# Patient Record
Sex: FEMALE | Race: BLACK OR AFRICAN AMERICAN | NOT HISPANIC OR LATINO | URBAN - METROPOLITAN AREA
[De-identification: names, ages, dates, MRNs, and addresses within clinical notes are randomized per-mention and may not be internally consistent; named-entity substitution may affect disease eponyms.]

---

## 2017-03-01 ENCOUNTER — MYC MEDICAL ADVICE (OUTPATIENT)
Dept: FAMILY MEDICINE | Facility: CLINIC | Age: 42
End: 2017-03-01

## 2017-03-02 ENCOUNTER — OFFICE VISIT (OUTPATIENT)
Dept: FAMILY MEDICINE | Facility: CLINIC | Age: 42
End: 2017-03-02
Payer: COMMERCIAL

## 2017-03-02 VITALS
WEIGHT: 237 LBS | BODY MASS INDEX: 33.93 KG/M2 | SYSTOLIC BLOOD PRESSURE: 134 MMHG | HEIGHT: 70 IN | OXYGEN SATURATION: 98 % | TEMPERATURE: 97.9 F | HEART RATE: 83 BPM | DIASTOLIC BLOOD PRESSURE: 83 MMHG

## 2017-03-02 DIAGNOSIS — J20.8 VIRAL BRONCHITIS: Primary | ICD-10-CM

## 2017-03-02 PROCEDURE — 99213 OFFICE O/P EST LOW 20 MIN: CPT | Performed by: FAMILY MEDICINE

## 2017-03-02 RX ORDER — FLUTICASONE PROPIONATE 50 MCG
1-2 SPRAY, SUSPENSION (ML) NASAL DAILY
Qty: 1 BOTTLE | Refills: 11 | Status: SHIPPED | OUTPATIENT
Start: 2017-03-02 | End: 2018-04-15

## 2017-03-02 RX ORDER — PREDNISONE 20 MG/1
40 TABLET ORAL DAILY
Qty: 10 TABLET | Refills: 0 | Status: SHIPPED | OUTPATIENT
Start: 2017-03-02 | End: 2017-03-07

## 2017-03-02 NOTE — NURSING NOTE
"Chief Complaint   Patient presents with     URI       Initial /83 (BP Location: Right arm, Cuff Size: Adult Large)  Pulse 83  Temp 97.9  F (36.6  C) (Tympanic)  Ht 5' 10\" (1.778 m)  Wt 237 lb (107.5 kg)  SpO2 98%  BMI 34.01 kg/m2 Estimated body mass index is 34.01 kg/(m^2) as calculated from the following:    Height as of this encounter: 5' 10\" (1.778 m).    Weight as of this encounter: 237 lb (107.5 kg).  Medication Reconciliation: complete  "

## 2017-03-02 NOTE — PROGRESS NOTES
SUBJECTIVE:                                                    Cristo Connolly is a 42 year old female who presents to clinic today for the following health issues:      Acute Illness   Acute illness concerns: cough   Onset: 1 week     Fever: no     Chills/Sweats: no     Headache (location?): no     Sinus Pressure:YES    Conjunctivitis:  no    Ear Pain: no    Rhinorrhea: YES    Congestion: YES    Sore Throat: no      Cough: YES-non-productive, productive of yellow sputum, productive of green sputum, with shortness of breath    Wheeze: no     Decreased Appetite: no     Nausea: no     Vomiting: no     Diarrhea:  no     Dysuria/Freq.: no     Fatigue/Achiness: YES    Sick/Strep Exposure: YES- work     Therapies Tried and outcome: nyquil rick lemon and honey       Patient is a 42 yr old female here with cough of 5 days duration. Cough is mostly dry though atimes it is productive. Patient reports that she has had no shortness of breath, a little bit of burning in the chest , no fevers or chills. She has been taking some over the counter medication without much relief. She is also mildly congested.     Problem list and histories reviewed & adjusted, as indicated.  Additional history: as documented    Patient Active Problem List   Diagnosis     Internal hemorrhoids with complication     Past Surgical History   Procedure Laterality Date     Hemorrhoidectomy internal  5/16/2014     Procedure: HEMORRHOIDECTOMY INTERNAL;  Surgeon: Myriam Joshua MD;  Location: [facility] OR     Exam under anesthesia rectum N/A 4/5/2016     Procedure: EXAM UNDER ANESTHESIA RECTUM;  Surgeon: Aric Eason MD;  Location: [facility] OR       Social History   Substance Use Topics     Smoking status: Never Smoker     Smokeless tobacco: Not on file     Alcohol use No     No family history on file.      Current Outpatient Prescriptions   Medication Sig Dispense Refill     predniSONE (DELTASONE) 20 MG tablet Take 2 tablets (40 mg) by mouth daily for 5  "days 10 tablet 0     fluticasone (FLONASE) 50 MCG/ACT spray Spray 1-2 sprays into both nostrils daily 1 Bottle 11     NIFEdipine POWD Trying to order nifedipine gel 0.2 % every eight hours (Patient not taking: Reported on 3/2/2017) 1 Bottle 1     HYDROcodone-acetaminophen (NORCO) 5-325 MG per tablet Take 1-2 tablets by mouth every 6 hours as needed (Patient not taking: Reported on 3/2/2017) 45 tablet 0     nitroglycerin 0.2% oint, FV COMPOUNDED, 0.2% OINT Apply 30 g topically 4 times daily as needed (Patient not taking: Reported on 3/2/2017) 30 g 0     Acetaminophen (TYLENOL PO) Reported on 3/2/2017       docusate sodium 100 MG tablet Take 100 mg by mouth two times daily (Patient not taking: Reported on 3/2/2017) 60 tablet 1     Naproxen Sodium (ALEVE PO) Take 220 mg by mouth 2 times daily (with meals) Reported on 3/2/2017       Allergies   Allergen Reactions     Quinine      BP Readings from Last 3 Encounters:   03/02/17 134/83   04/25/16 112/67   04/05/16 130/70    Wt Readings from Last 3 Encounters:   03/02/17 237 lb (107.5 kg)   04/25/16 280 lb (127 kg)   04/05/16 280 lb (127 kg)                  Labs reviewed in EPIC    Reviewed and updated as needed this visit by clinical staff  Allergies       Reviewed and updated as needed this visit by Provider         ROS:  Constitutional, HEENT, cardiovascular, pulmonary, gi and gu systems are negative, except as otherwise noted.    OBJECTIVE:                                                    /83 (BP Location: Right arm, Cuff Size: Adult Large)  Pulse 83  Temp 97.9  F (36.6  C) (Tympanic)  Ht 5' 10\" (1.778 m)  Wt 237 lb (107.5 kg)  SpO2 98%  BMI 34.01 kg/m2  Body mass index is 34.01 kg/(m^2).  GENERAL: healthy, alert and no distress  NECK: no adenopathy, no asymmetry, masses, or scars and thyroid normal to palpation  RESP: lungs clear to auscultation - no rales, rhonchi or wheezes  CV: regular rate and rhythm, normal S1 S2, no S3 or S4, no murmur, click or rub, no " peripheral edema and peripheral pulses strong  MS: no gross musculoskeletal defects noted, no edema    Diagnostic Test Results:  none      ASSESSMENT/PLAN:                                                          ICD-10-CM    1. Viral bronchitis J20.8 predniSONE (DELTASONE) 20 MG tablet     fluticasone (FLONASE) 50 MCG/ACT spray       FUTURE APPOINTMENTS:       - Follow-up visit as needed.    Antoni Palmer MD  [facility]

## 2017-03-02 NOTE — MR AVS SNAPSHOT
After Visit Summary   3/2/2017    Cristo Connolly    MRN: [id number]           Patient Information     Date Of Birth          1975        Visit Information        Provider Department      3/2/2017 7:40 AM Antoni Palmer MD [facility]        Today's Diagnoses     Viral bronchitis    -  1      Care Instructions          Thank you for choosing [facility].  You may be receiving a survey in the mail from [facility] regarding your visit today.  Please take a few minutes to complete and return the survey to let us know how we are doing.      If you have questions or concerns, please contact us via [employer] or you can contact your care team at [phone].    Our Clinic hours are:  Monday 6:40 am  to 7:00 pm  Tuesday -Friday 6:40 am to 5:00 pm    The [place] outpatient lab hours are:  Monday - Friday 6:10 am to 4:45 pm  Saturdays 7:00 am to 11:00 am  Appointments are required, call [phone]    If you have clinical questions after hours or would like to schedule an appointment,  call the clinic at [phone].  Bronchitis, Viral (Adult)    You have a viral bronchitis. Bronchitis is inflammation and swelling of the lining of the lungs. This is often caused by an infection. Symptoms include a dry, hacking cough that is worse at night. The cough may bring up yellow-green mucus. You may also feel short of breath or wheeze. Other symptoms may include tiredness, chest discomfort, and chills.  Bronchitis that is caused by a virus is not treated with antibiotics. Instead, medicines may be given to help relieve symptoms. Symptoms can last up to 2 weeks, although the cough may last much longer.  This illness is contagious during the first few days and is spread through the air by coughing and sneezing, or by direct contact (touching the sick person and then touching your own eyes, nose, or mouth).  Most viral illnesses resolve within 10 to 14 days with rest and simple home  remedies, although they may sometimes last for several weeks.  Home care    If symptoms are severe, rest at home for the first 2 to 3 days. When you go back to your usual activities, don't let yourself get too tired.    Do not smoke. Also avoid being exposed to secondhand smoke.    You may use over-the-counter medicine to control fever or pain, unless another pain medicine was prescribed. (Note: If you have chronic liver or kidney disease or have ever had a stomach ulcer or gastrointestinal bleeding, talk with your healthcare provider before using these medicines. Also talk to your provider if you are taking medicine to prevent blood clots.) Aspirin should never be given to anyone younger than 18 years of age who is ill with a viral infection or fever. It may cause severe liver or brain damage.    Your appetite may be poor, so a light diet is fine. Avoid dehydration by drinking 6 to 8 glasses of fluids per day (such as water, soft drinks, sports drinks, juices, tea, or soup). Extra fluids will help loosen secretions in the nose and lungs.    Over-the-counter cough, cold, and sore-throat medicines will not shorten the length of the illness, but they may help to reduce symptoms. (Note: Do not use decongestants if you have high blood pressure.)  Follow-up care  Follow up with your healthcare provider, or as advised.  If you had an X-ray or ECG (electrocardiogram), a specialist will review it. You will be notified of any new findings that may affect your care.  Note: If you are age 65 or older, or if you have a chronic lung disease or condition that affects your immune system, or you smoke, talk to your healthcare provider about having pneumococcal vaccinations and a yearly influenza vaccination (flu shot).  When to seek medical advice   Call your healthcare provider right away if any of these occur:    Fever of 100.4 F (38 C) or higher    Coughing up increased amounts of colored sputum    Weakness, drowsiness,  headache, facial pain, ear pain, or a stiff neck  Call 911, or get immediate medical care  Contact emergency services right away if any of these occur:    Coughing up blood    Worsening weakness, drowsiness, headache, or stiff neck    Trouble breathing, wheezing, or pain with breathing    4267-9403 [employer]. [address] [place] [address]. All rights reserved. This information is not intended as a substitute for professional medical care. Always follow your healthcare professional's instructions.              Follow-ups after your visit        Who to contact     If you have questions or need follow up information about today's clinic visit or your schedule please contact [facility] directly at [phone].  Normal or non-critical lab and imaging results will be communicated to you by [employer], letter or phone within 4 business days after the clinic has received the results. If you do not hear from us within 7 days, please contact the clinic through [employer] or phone. If you have a critical or abnormal lab result, we will notify you by phone as soon as possible.  Submit refill requests through [email or web address] or call your pharmacy and they will forward the refill request to us. Please allow 3 business days for your refill to be completed.          Additional Information About Your Visit        [employer] Information     [email or web address] gives you secure access to your electronic health record. If you see a primary care provider, you can also send messages to your care team and make appointments. If you have questions, please call your primary care clinic.  If you do not have a primary care provider, please call [phone] and they will assist you.        Care EveryWhere ID     This is your Care EveryWhere ID. This could be used by other organizations to access your [place] medical records  [id number]        Your Vitals Were     Pulse Temperature Height Pulse Oximetry BMI (Body Mass Index)     "   83 97.9  F (36.6  C) (Tympanic) 5' 10\" (1.778 m) 98% 34.01 kg/m2        Blood Pressure from Last 3 Encounters:   03/02/17 134/83   04/25/16 112/67   04/05/16 130/70    Weight from Last 3 Encounters:   03/02/17 237 lb (107.5 kg)   04/25/16 280 lb (127 kg)   04/05/16 280 lb (127 kg)              Today, you had the following     No orders found for display         Today's Medication Changes          These changes are accurate as of: 3/2/17  8:02 AM.  If you have any questions, ask your nurse or doctor.               Start taking these medicines.        Dose/Directions    fluticasone 50 MCG/ACT spray   Commonly known as:  FLONASE   Used for:  Viral bronchitis   Started by:  Antoni Palmer MD        Dose:  1-2 spray   Spray 1-2 sprays into both nostrils daily   Quantity:  1 Bottle   Refills:  11       predniSONE 20 MG tablet   Commonly known as:  DELTASONE   Used for:  Viral bronchitis   Started by:  Antoni Palmer MD        Dose:  40 mg   Take 2 tablets (40 mg) by mouth daily for 5 days   Quantity:  10 tablet   Refills:  0            Where to get your medicines      These medications were sent to [facility] [address]  [address] [place] [address]     Phone:  [phone]     fluticasone 50 MCG/ACT spray    predniSONE 20 MG tablet                Primary Care Provider    None       No address on file        Thank you!     Thank you for choosing [facility]  for your care. Our goal is always to provide you with excellent care. Hearing back from our patients is one way we can continue to improve our services. Please take a few minutes to complete the written survey that you may receive in the mail after your visit with us. Thank you!             Your Updated Medication List - Protect others around you: Learn how to safely use, store and throw away your medicines at www.disposemymeds.org.          This list is accurate as of: 3/2/17  8:02 " AM.  Always use your most recent med list.                   Brand Name Dispense Instructions for use    ALEVE PO      Take 220 mg by mouth 2 times daily (with meals) Reported on 3/2/2017       docusate sodium 100 MG tablet    COLACE    60 tablet    Take 100 mg by mouth two times daily       fluticasone 50 MCG/ACT spray    FLONASE    1 Bottle    Spray 1-2 sprays into both nostrils daily       HYDROcodone-acetaminophen 5-325 MG per tablet    NORCO    45 tablet    Take 1-2 tablets by mouth every 6 hours as needed       NIFEdipine Powd     1 Bottle    Trying to order nifedipine gel 0.2 % every eight hours       nitroglycerin 0.2% oint (FV COMPOUNDED) 0.2% Oint     30 g    Apply 30 g topically 4 times daily as needed       predniSONE 20 MG tablet    DELTASONE    10 tablet    Take 2 tablets (40 mg) by mouth daily for 5 days       TYLENOL PO      Reported on 3/2/2017

## 2017-03-02 NOTE — PATIENT INSTRUCTIONS
Thank you for choosing [facility].  You may be receiving a survey in the mail from [employer] [facility] regarding your visit today.  Please take a few minutes to complete and return the survey to let us know how we are doing.      If you have questions or concerns, please contact us via "[employer] or you can contact your care team at [phone].    Our Clinic hours are:  Monday 6:40 am  to 7:00 pm  Tuesday -Friday 6:40 am to 5:00 pm    The [place] outpatient lab hours are:  Monday - Friday 6:10 am to 4:45 pm  Saturdays 7:00 am to 11:00 am  Appointments are required, call [phone]    If you have clinical questions after hours or would like to schedule an appointment,  call the clinic at [phone].  Bronchitis, Viral (Adult)    You have a viral bronchitis. Bronchitis is inflammation and swelling of the lining of the lungs. This is often caused by an infection. Symptoms include a dry, hacking cough that is worse at night. The cough may bring up yellow-green mucus. You may also feel short of breath or wheeze. Other symptoms may include tiredness, chest discomfort, and chills.  Bronchitis that is caused by a virus is not treated with antibiotics. Instead, medicines may be given to help relieve symptoms. Symptoms can last up to 2 weeks, although the cough may last much longer.  This illness is contagious during the first few days and is spread through the air by coughing and sneezing, or by direct contact (touching the sick person and then touching your own eyes, nose, or mouth).  Most viral illnesses resolve within 10 to 14 days with rest and simple home remedies, although they may sometimes last for several weeks.  Home care    If symptoms are severe, rest at home for the first 2 to 3 days. When you go back to your usual activities, don't let yourself get too tired.    Do not smoke. Also avoid being exposed to secondhand smoke.    You may use over-the-counter medicine to control fever or pain, unless another  pain medicine was prescribed. (Note: If you have chronic liver or kidney disease or have ever had a stomach ulcer or gastrointestinal bleeding, talk with your healthcare provider before using these medicines. Also talk to your provider if you are taking medicine to prevent blood clots.) Aspirin should never be given to anyone younger than 18 years of age who is ill with a viral infection or fever. It may cause severe liver or brain damage.    Your appetite may be poor, so a light diet is fine. Avoid dehydration by drinking 6 to 8 glasses of fluids per day (such as water, soft drinks, sports drinks, juices, tea, or soup). Extra fluids will help loosen secretions in the nose and lungs.    Over-the-counter cough, cold, and sore-throat medicines will not shorten the length of the illness, but they may help to reduce symptoms. (Note: Do not use decongestants if you have high blood pressure.)  Follow-up care  Follow up with your healthcare provider, or as advised.  If you had an X-ray or ECG (electrocardiogram), a specialist will review it. You will be notified of any new findings that may affect your care.  Note: If you are age 65 or older, or if you have a chronic lung disease or condition that affects your immune system, or you smoke, talk to your healthcare provider about having pneumococcal vaccinations and a yearly influenza vaccination (flu shot).  When to seek medical advice   Call your healthcare provider right away if any of these occur:    Fever of 100.4 F (38 C) or higher    Coughing up increased amounts of colored sputum    Weakness, drowsiness, headache, facial pain, ear pain, or a stiff neck  Call 911, or get immediate medical care  Contact emergency services right away if any of these occur:    Coughing up blood    Worsening weakness, drowsiness, headache, or stiff neck    Trouble breathing, wheezing, or pain with breathing    [phone] [employer]. [address] [place] [address], [place] [address].  All rights reserved. This information is not intended as a substitute for professional medical care. Always follow your healthcare professional's instructions.

## 2017-12-17 ENCOUNTER — HEALTH MAINTENANCE LETTER (OUTPATIENT)
Age: 42
End: 2017-12-17

## 2018-04-15 ENCOUNTER — HOSPITAL ENCOUNTER (EMERGENCY)
Facility: CLINIC | Age: 43
Discharge: HOME | End: 2018-04-15
Attending: FAMILY MEDICINE | Admitting: FAMILY MEDICINE

## 2018-04-15 ENCOUNTER — APPOINTMENT (OUTPATIENT)
Dept: GENERAL RADIOLOGY | Facility: CLINIC | Age: 43
End: 2018-04-15
Attending: FAMILY MEDICINE

## 2018-04-15 VITALS
OXYGEN SATURATION: 100 % | SYSTOLIC BLOOD PRESSURE: 158 MMHG | HEART RATE: 62 BPM | DIASTOLIC BLOOD PRESSURE: 81 MMHG | RESPIRATION RATE: 18 BRPM | TEMPERATURE: 98.4 F

## 2018-04-15 DIAGNOSIS — S89.91XA INJURY OF RIGHT KNEE, INITIAL ENCOUNTER: ICD-10-CM

## 2018-04-15 PROCEDURE — 99283 EMERGENCY DEPT VISIT LOW MDM: CPT | Performed by: FAMILY MEDICINE

## 2018-04-15 PROCEDURE — 99282 EMERGENCY DEPT VISIT SF MDM: CPT | Mod: Z6 | Performed by: FAMILY MEDICINE

## 2018-04-15 PROCEDURE — 73562 X-RAY EXAM OF KNEE 3: CPT | Mod: RT

## 2018-04-15 ASSESSMENT — ENCOUNTER SYMPTOMS
FEVER: 0
MYALGIAS: 1
WEAKNESS: 0

## 2018-04-15 NOTE — ED PROVIDER NOTES
History     Chief Complaint   Patient presents with     Knee Pain     HPI  Cristo Connolly is a 43 year old female with no history of medical problems presents to the ED with right knee pain. The patient reports yesterday she slipped and fell with a flexed right knee, striking the medial aspect of the right knee against the snow. The patient reports she was able to stand and walk after the fall. She reports the majority of the pain came on after she went to bed.   decreased range of motion overnight    Patient Active Problem List   Diagnosis     Internal hemorrhoids with complication     Current Outpatient Prescriptions   Medication Sig Dispense Refill     fluticasone (FLONASE) 50 MCG/ACT spray Spray 1-2 sprays into both nostrils daily 1 Bottle 11     NIFEdipine POWD Trying to order nifedipine gel 0.2 % every eight hours (Patient not taking: Reported on 3/2/2017) 1 Bottle 1     HYDROcodone-acetaminophen (NORCO) 5-325 MG per tablet Take 1-2 tablets by mouth every 6 hours as needed (Patient not taking: Reported on 3/2/2017) 45 tablet 0     nitroglycerin 0.2% oint, FV COMPOUNDED, 0.2% OINT Apply 30 g topically 4 times daily as needed (Patient not taking: Reported on 3/2/2017) 30 g 0     Acetaminophen (TYLENOL PO) Reported on 3/2/2017       docusate sodium 100 MG tablet Take 100 mg by mouth two times daily (Patient not taking: Reported on 3/2/2017) 60 tablet 1     Naproxen Sodium (ALEVE PO) Take 220 mg by mouth 2 times daily (with meals) Reported on 3/2/2017       Allergies   Allergen Reactions     Quinine      Problem List:    Patient Active Problem List    Diagnosis Date Noted     Internal hemorrhoids with complication 05/15/2014     Priority: Medium        Past Medical History:    No past medical history on file.    Past Surgical History:    Past Surgical History:   Procedure Laterality Date     EXAM UNDER ANESTHESIA RECTUM N/A 4/5/2016    Procedure: EXAM UNDER ANESTHESIA RECTUM;  Surgeon: Aric Eason MD;   Location: [facility] OR     HEMORRHOIDECTOMY INTERNAL  5/16/2014    Procedure: HEMORRHOIDECTOMY INTERNAL;  Surgeon: Myriam Joshua MD;  Location: [facility] OR       Family History:    No family history on file.    Social History:  Marital Status:  Single [1]  Social History   Substance Use Topics     Smoking status: Never Smoker     Smokeless tobacco: Not on file     Alcohol use No        Medications:      fluticasone (FLONASE) 50 MCG/ACT spray   NIFEdipine POWD   HYDROcodone-acetaminophen (NORCO) 5-325 MG per tablet   nitroglycerin 0.2% oint, FV COMPOUNDED, 0.2% OINT   Acetaminophen (TYLENOL PO)   docusate sodium 100 MG tablet   Naproxen Sodium (ALEVE PO)         Review of Systems   Constitutional: Negative for fever.   Musculoskeletal: Positive for myalgias.   Skin: Negative for pallor.   Neurological: Negative for weakness.   All other systems reviewed and are negative.      Physical Exam   BP: 158/81  Pulse: 62  Temp: 98.4  F (36.9  C)  Resp: 18  SpO2: 100 %      Physical Exam    Cardiovascular: normal dorsal pedal pulse, normal posterior tibial pulse. normal cap fill   Musculoskeletal: no obvious deformities, right knee similar to left knee for landmarks, patella is non-tender, flexion to 30 degree of right knee is painful, tender to palpation of medial joint line and medial aspect of tibia with proximity,   negative tap test of distal tibia,  Neuro: distal motor function intact    ED Course     ED Course     Procedures               Critical Care time:  none               No results found for this or any previous visit (from the past 24 hour(s)).    Medications - No data to display     10:43 AM Patient Assessed.     Assessments & Plan (with Medical Decision Making)     MDM: Cristo Connolly is a 43 year old female who presented with injury to the right knee that occurred yesterday when she was in snow.  Her knee struck the snow primarily at the medial aspect and she does have medial joint line tenderness.  Right  knee range of motion is reduced.  No obvious deformity.  Normal neurovascular examination.  X-ray is negative.  Discussed analgesics for home with precautions for return.    I have reviewed the nursing notes.    I have reviewed the findings, diagnosis, plan and need for follow up with the patient.       New Prescriptions    No medications on file       Final diagnoses:   Injury of right knee, initial encounter - medial joint line - follow-up 5 days for recheck and re-xray.  crutch walking only.  ice to the area on for 20 min per hour for at least the first 3 days. elevate.  maintain knee range of motion. ibnuprofen for pain. return for worsening, numb, cold, immobile foot.     This document serves as a record of the services and decisions personally performed and made by No att. providers found. It was created on HIS/HER behalf by   Shantel Connors, a trained medical scribe. The creation of this document is based the provider's statements to the medical scribe.  Shantel Connors 10:43 AM 4/15/2018    Provider:   The information in this document, created by the medical scribe for me, accurately reflects the services I personally performed and the decisions made by me. I have reviewed and approved this document for accuracy prior to leaving the patient care area.  No att. providers found 10:43 AM 4/15/2018    4/15/2018   [facility] EMERGENCY DEPARTMENT     Adrian Ibarra MD  04/15/18 [id number]

## 2018-04-15 NOTE — ED AVS SNAPSHOT
[facility] Emergency Department    [address] [facility] [address]    Phone:  [phone]    Fax:  [phone]                                       Cristo Connolly   MRN: [id number]    Department:  [facility] Emergency Department   Date of Visit:  4/15/2018           After Visit Summary Signature Page     I have received my discharge instructions, and my questions have been answered. I have discussed any challenges I see with this plan with the nurse or doctor.    ..........................................................................................................................................  Patient/Patient Representative Signature      ..........................................................................................................................................  Patient Representative Print Name and Relationship to Patient    ..................................................               ................................................  Date                                            Time    ..........................................................................................................................................  Reviewed by Signature/Title    ...................................................              ..............................................  Date                                                            Time

## 2018-04-15 NOTE — ED AVS SNAPSHOT
[facility] Emergency Department    [address] [address]    Phone:  [phone]    Fax:  [phone]                                       Cristo Connolly   MRN: [id number]    Department:  [facility] Emergency Department   Date of Visit:  4/15/2018           Patient Information     Date Of Birth          1975        Your diagnoses for this visit were:     Injury of right knee, initial encounter - medial joint line follow-up 5 days for recheck and re-xray.  crutch walking only.  ice to the area on for 20 min per hour for at least the first 3 days. elevate.  maintain knee range of motion. ibnuprofen for pain. return for worsening, numb, cold, immobile foot.       You were seen by Adrian Ibarra MD.      Follow-up Information     Follow up with primary doctor In 1 week.        Follow up with [facility] Emergency Department.    Specialty:  EMERGENCY MEDICINE    Why:  As needed, If symptoms worsen    Contact information:    [address] [place] [id number] [phone]    Additional information:    The medical center is located at   [address]. (between [address] in [place], [place] north   of [place]).        Discharge Instructions         ICD-10-CM    1. Injury of right knee, initial encounter - medial joint line S89.91XA     follow-up 5 days for recheck.  crutch walking only.  ice to the area on for 20 min per hour for at least the first 3 days. elevate.  maintain knee range of motion. ibnuprofen for pain. return for worsening, numb, cold, immobile foot.         Discharge References/Attachments     MENISCAL TEARS, UNDERSTANDING (ENGLISH)      24 Hour Appointment Hotline       To make an appointment at any [facility], call [phone] ([phone]). If you don't have a family doctor or clinic, we will help you find one. [facility] clinics are conveniently located to serve the needs of you and your family.             Review  of your medicines      Our records show that you are taking the medicines listed below. If these are incorrect, please call your family doctor or clinic.        Dose / Directions Last dose taken    IBUPROFEN PO   Dose:  400 mg        Take 400 mg by mouth every 6 hours as needed for moderate pain   Refills:  0                Procedures and tests performed during your visit     XR Knee Right 3 Views      Orders Needing Specimen Collection     None      Pending Results     No orders found from 4/13/2018 to 4/16/2018.            Pending Culture Results     No orders found from 4/13/2018 to 4/16/2018.            Pending Results Instructions     If you had any lab results that were not finalized at the time of your Discharge, you can call the ED Lab Result RN at [phone]. You will be contacted by this team for any positive Lab results or changes in treatment. The nurses are available 7 days a week from 10A to 6:30P.  You can leave a message 24 hours per day and they will return your call.        Test Results From Your Hospital Stay        4/15/2018 11:43 AM      Narrative     RIGHT KNEE TWO VIEWS   4/15/2018 11:34 AM     HISTORY:  Medial knee joint-line tenderness after fall.     COMPARISON: None.        Impression     IMPRESSION: No evidence of fracture. 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    SHON NAPIER MD                Thank you for choosing [facility]       Thank you for choosing [facility] for your care. Our goal is always to provide you with excellent care. Hearing back from our patients is one way we can continue to improve our services. Please take a few minutes to complete the written survey that you may receive in the mail after you visit with us. Thank you!        [email or web address] Information     [facility] gives you secure access to your electronic health record. If you see a primary care provider, you can also send messages to your care team and make appointments. If  you have questions, please call your primary care clinic.  If you do not have a primary care provider, please call [phone] and they will assist you.        Care EveryWhere ID     This is your Care EveryWhere ID. This could be used by other organizations to access your [place] medical records  [id number]        Equal Access to Services     JAMIE GOODRICH : Kedar Michel, viv villagran, azul ng. So [facility] [phone].    ATENCIÓN: Si habla español, tiene a ko disposición servicios gratuitos de asistencia lingüística. Llame al [phone].    We comply with applicable federal civil rights laws and [place] laws. We do not discriminate on the basis of race, color, national origin, age, disability, sex, sexual orientation, or gender identity.            After Visit Summary       This is your record. Keep this with you and show to your community pharmacist(s) and doctor(s) at your next visit.

## 2018-04-15 NOTE — DISCHARGE INSTRUCTIONS
ICD-10-CM    1. Injury of right knee, initial encounter - medial joint line S89.91XA     follow-up 5 days for recheck.  crutch walking only.  ice to the area on for 20 min per hour for at least the first 3 days. elevate.  maintain knee range of motion. ibnuprofen for pain. return for worsening, numb, cold, immobile foot.

## 2018-04-16 ENCOUNTER — MYC MEDICAL ADVICE (OUTPATIENT)
Dept: FAMILY MEDICINE | Facility: CLINIC | Age: 43
End: 2018-04-16

## 2018-04-23 ENCOUNTER — OFFICE VISIT (OUTPATIENT)
Dept: FAMILY MEDICINE | Facility: CLINIC | Age: 43
End: 2018-04-23

## 2018-04-23 VITALS
SYSTOLIC BLOOD PRESSURE: 121 MMHG | TEMPERATURE: 97.2 F | HEIGHT: 71 IN | DIASTOLIC BLOOD PRESSURE: 67 MMHG | HEART RATE: 86 BPM | BODY MASS INDEX: 41.02 KG/M2 | WEIGHT: 293 LBS

## 2018-04-23 DIAGNOSIS — M25.561 ACUTE PAIN OF RIGHT KNEE: Primary | ICD-10-CM

## 2018-04-23 PROCEDURE — 99213 OFFICE O/P EST LOW 20 MIN: CPT | Performed by: FAMILY MEDICINE

## 2018-04-23 NOTE — PROGRESS NOTES
SUBJECTIVE:   Cristo Connolly is a 43 year old female who presents to clinic today for the following health issues:      ED/UC Followup:    Facility:  [facility]  Date of visit: 4-15-18  Reason for visit: injury to R knee  Current Status: still having pain          Joint Pain    Onset: 4-14-15    Description: Patient fell on her R/knee 4-14-18 it became unbearable on 4-15- and was seen in the ER they did x-ray   Location: right knee  Character: Dull ache    Intensity: moderate    Progression of Symptoms: same    Accompanying Signs & Symptoms:  Other symptoms: none    History:   Previous similar pain: no       Precipitating factors:   Trauma or overuse: YES- Patient fell    Alleviating factors:  Improved by: ice and Ibuprofen    Therapies Tried and outcome: Patient was seen in the ER 4-15-18 x-ray was done     Patient is a 43 yr old female here for follow up on right knee injury ; she slipped on ice a couple of weeks ago. Still having severe pain especially when she gets up from a sitting position. She denies any swelling. X-rays done did not show any fracture.    Problem list and histories reviewed & adjusted, as indicated.  Additional history: as documented    Patient Active Problem List   Diagnosis     Internal hemorrhoids with complication     Past Surgical History:   Procedure Laterality Date     EXAM UNDER ANESTHESIA RECTUM N/A 4/5/2016    Procedure: EXAM UNDER ANESTHESIA RECTUM;  Surgeon: Aric Eason MD;  Location: [facility] OR     HEMORRHOIDECTOMY INTERNAL  5/16/2014    Procedure: HEMORRHOIDECTOMY INTERNAL;  Surgeon: Myriam Joshua MD;  Location: [facility] OR       Social History   Substance Use Topics     Smoking status: Never Smoker     Smokeless tobacco: Never Used     Alcohol use No     History reviewed. No pertinent family history.      Current Outpatient Prescriptions   Medication Sig Dispense Refill     IBUPROFEN PO Take 400 mg by mouth every 6 hours as needed for moderate pain       Allergies   Allergen  "Reactions     Quinine Itching     BP Readings from Last 3 Encounters:   04/23/18 121/67   04/15/18 158/81   03/02/17 134/83    Wt Readings from Last 3 Encounters:   04/23/18 (!) 345 lb (156.5 kg)   03/02/17 237 lb (107.5 kg)   04/25/16 280 lb (127 kg)                  Labs reviewed in EPIC    Reviewed and updated as needed this visit by clinical staff  Tobacco  Allergies  Med Hx  Surg Hx  Fam Hx  Soc Hx      Reviewed and updated as needed this visit by Provider         ROS:  Constitutional, HEENT, cardiovascular, pulmonary, gi and gu systems are negative, except as otherwise noted.    OBJECTIVE:     /67  Pulse 86  Temp 97.2  F (36.2  C) (Tympanic)  Ht 5' 10.5\" (1.791 m)  Wt (!) 345 lb (156.5 kg)  BMI 48.8 kg/m2  Body mass index is 48.8 kg/(m^2).  GENERAL: healthy, alert and no distress  EYES: Eyes grossly normal to inspection, PERRL and conjunctivae and sclerae normal  HENT: ear canals and TM's normal, nose and mouth without ulcers or lesions  NECK: no adenopathy, no asymmetry, masses, or scars and thyroid normal to palpation  RESP: lungs clear to auscultation - no rales, rhonchi or wheezes  CV: regular rate and rhythm, normal S1 S2, no S3 or S4, no murmur, click or rub, no peripheral edema and peripheral pulses strong  MS: decreased range of motion and pain on  medical aspect of knee   SKIN: no suspicious lesions or rashes    Diagnostic Test Results:  Results for orders placed or performed during the hospital encounter of 04/15/18   XR Knee Right 3 Views    Narrative    RIGHT KNEE TWO VIEWS   4/15/2018 11:34 AM     HISTORY:  Medial knee joint-line tenderness after fall.     COMPARISON: None.      Impression    IMPRESSION: No evidence of fracture. 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    SHON NAPIER MD       ASSESSMENT/PLAN:       1. Acute pain of right knee  Patient likely has a meniscal injury, will refer to sports medicine for consideration for an " MRI or cortisone injection   - ORTHO  REFERRAL  - order for DME; Equipment being ordered: Knee sleeve 2 XL  Dispense: 1 Units; Refill: 0    FUTURE APPOINTMENTS:       - Follow-up visit as needed.    Antoni Palmer MD  [facility]

## 2018-04-23 NOTE — PATIENT INSTRUCTIONS
Thank you for choosing [facility] [place] [facility].  You may be receiving a survey in the mail from Karmen Vásquez regarding your visit today.  Please take a few minutes to complete and return the survey to let us know how we are doing.      If you have questions or concerns, please contact us via [employer] or you can contact your care team at [phone].    Our Clinic hours are:  Monday 6:40 am  to 7:00 pm  Tuesday -Friday 6:40 am to 5:00 pm    The [place] outpatient lab hours are:  Monday - Friday 6:10 am to 4:45 pm  Saturdays 7:00 am to 11:00 am  Appointments are required, call [phone]    If you have clinical questions after hours or would like to schedule an appointment,  call the clinic at [phone].  Knee Pain  Knee pain is very common. It s especially common in active people who put a lot of pressure on their knees, like runners. It affects women more often than men.  Your kneecap (patella) is a thick, round bone. It covers and protects the front portion of your knee joint. It moves along a groove in your thighbone (femur) as part of the patellofemoral joint. A layer of cartilage surrounds the underside of your kneecap. This layer protects it from grinding against your femur.  When this cartilage softens and breaks down, it can cause knee pain. This is partly because of repetitive stress. The stress irritates the lining of the joint. This causes pain in the underlying bone.  What causes knee pain?  Many things can cause knee pain. You may have more than one cause. Some of these include:    Overuse of the knee joint    The kneecap doesn t line up with the tissue around it    Damage to small nerves in the area    Damage to the ligament-like structure that holds the kneecap in place (retinaculum)    Breakdown of the bone under the cartilage    Swelling in the soft tissues around the kneecap    Injury  You might be more likely to have knee pain if you:    Exercise a lot    Recently increased the intensity of your  workouts    Have a body mass index (BMI) greater than 25    Have poor alignment of your kneecap    Walk with your feet turned overly outward or inward    Have weakness in surrounding muscle groups (inner quad or hip adductor muscles)    Have too much tightness in surrounding muscle groups (hamstrings or iliotibial band)    Have a recent history of injury to the area    Are female  Symptoms of knee pain  This type of knee pain is a dull, aching pain in the front of the knee in the area under and around the kneecap. This pain may start quickly or slowly. Your pain might be worse when you squat, run, or sit for a long time. You might also sometimes feel like your knee is giving out. You may have symptoms in one or both of your knees.  Diagnosing knee pain  Your healthcare provider will ask about your medical history and your symptoms. Be sure to describe any activities that make your knee pain worse. He or she will look at your knee. This will include tests of your range of motion, strength, and areas of pain of your knee. Your knee alignment will be checked.  Your healthcare provider will need to rule out other causes of your knee pain, such as arthritis. You may need an imaging test, such as an X-ray or MRI.  Treatment for knee pain  Treatments that can help ease your symptoms may include:    Avoiding activities for a while that make your pain worse, returning to activity over time    Icing the outside of your knee when it causes you pain    Taking over-the-counter pain medicine    Wearing a knee brace or taping your knee to support it    Wearing special shoe inserts to help keep your feet in the proper alignment    Doing special exercises to stretch and strengthen the muscles around your hip and your knee  These steps help most people manage knee pain. But some cases of knee pain need to be treated with surgery. You may need surgery right away. Or you may need it later if other treatments don t work. Your healthcare  provider may refer you to an orthopedic surgeon. He or she will talk with you about your choices.  Preventing knee pain  Losing weight and correcting excess muscle tightness or muscle weakness may help lower your risk.  In some cases, you can prevent knee pain. To help prevent a flare-up of knee pain, you do these things:    Regularly do all the exercises your doctor or physical therapist advises    Support your knee as advised by your doctor or physical therapist    Increase training gradually, and ease up on training when needed    Have an expert check your gait for running or other sporting activities    Stretch properly before and after exercise    Replace your running shoes regularly    Lose excess weight     When to call your healthcare provider  Call your healthcare provider right away if:    Your symptoms don t get better after a few weeks of treatment    You have any new symptoms   Date Last Reviewed: 4/1/2017 2000-2017 [employer]. [address] [place] [address], [place] [address]. All rights reserved. This information is not intended as a substitute for professional medical care. Always follow your healthcare professional's instructions.

## 2018-04-23 NOTE — NURSING NOTE
"Chief Complaint   Patient presents with     ER F/U       Initial /67  Pulse 86  Temp 97.2  F (36.2  C) (Tympanic)  Ht 5' 10.5\" (1.791 m)  Wt (!) 345 lb (156.5 kg)  BMI 48.8 kg/m2 Estimated body mass index is 48.8 kg/(m^2) as calculated from the following:    Height as of this encounter: 5' 10.5\" (1.791 m).    Weight as of this encounter: 345 lb (156.5 kg).  Medication Reconciliation: complete  "

## 2018-04-23 NOTE — MR AVS SNAPSHOT
After Visit Summary   4/23/2018    Cristo Connolly    MRN: [id number]           Patient Information     Date Of Birth          1975        Visit Information        Provider Department      4/23/2018 6:40 AM Antoni Palmer MD [facility]        Today's Diagnoses     Acute pain of right knee    -  1      Care Instructions          Thank you for choosing [facility].  You may be receiving a survey in the mail from [employer] regarding your visit today.  Please take a few minutes to complete and return the survey to let us know how we are doing.      If you have questions or concerns, please contact us via [facility] or you can contact your care team at [phone].    Our Clinic hours are:  Monday 6:40 am  to 7:00 pm  Tuesday -Friday 6:40 am to 5:00 pm    The [place] outpatient lab hours are:  Monday - Friday 6:10 am to 4:45 pm  Saturdays 7:00 am to 11:00 am  Appointments are required, call [phone]    If you have clinical questions after hours or would like to schedule an appointment,  call the clinic at [phone].  Knee Pain  Knee pain is very common. It s especially common in active people who put a lot of pressure on their knees, like runners. It affects women more often than men.  Your kneecap (patella) is a thick, round bone. It covers and protects the front portion of your knee joint. It moves along a groove in your thighbone (femur) as part of the patellofemoral joint. A layer of cartilage surrounds the underside of your kneecap. This layer protects it from grinding against your femur.  When this cartilage softens and breaks down, it can cause knee pain. This is partly because of repetitive stress. The stress irritates the lining of the joint. This causes pain in the underlying bone.  What causes knee pain?  Many things can cause knee pain. You may have more than one cause. Some of these include:    Overuse of the knee joint    The kneecap doesn t line up  with the tissue around it    Damage to small nerves in the area    Damage to the ligament-like structure that holds the kneecap in place (retinaculum)    Breakdown of the bone under the cartilage    Swelling in the soft tissues around the kneecap    Injury  You might be more likely to have knee pain if you:    Exercise a lot    Recently increased the intensity of your workouts    Have a body mass index (BMI) greater than 25    Have poor alignment of your kneecap    Walk with your feet turned overly outward or inward    Have weakness in surrounding muscle groups (inner quad or hip adductor muscles)    Have too much tightness in surrounding muscle groups (hamstrings or iliotibial band)    Have a recent history of injury to the area    Are female  Symptoms of knee pain  This type of knee pain is a dull, aching pain in the front of the knee in the area under and around the kneecap. This pain may start quickly or slowly. Your pain might be worse when you squat, run, or sit for a long time. You might also sometimes feel like your knee is giving out. You may have symptoms in one or both of your knees.  Diagnosing knee pain  Your healthcare provider will ask about your medical history and your symptoms. Be sure to describe any activities that make your knee pain worse. He or she will look at your knee. This will include tests of your range of motion, strength, and areas of pain of your knee. Your knee alignment will be checked.  Your healthcare provider will need to rule out other causes of your knee pain, such as arthritis. You may need an imaging test, such as an X-ray or MRI.  Treatment for knee pain  Treatments that can help ease your symptoms may include:    Avoiding activities for a while that make your pain worse, returning to activity over time    Icing the outside of your knee when it causes you pain    Taking over-the-counter pain medicine    Wearing a knee brace or taping your knee to support it    Wearing  special shoe inserts to help keep your feet in the proper alignment    Doing special exercises to stretch and strengthen the muscles around your hip and your knee  These steps help most people manage knee pain. But some cases of knee pain need to be treated with surgery. You may need surgery right away. Or you may need it later if other treatments don t work. Your healthcare provider may refer you to an orthopedic surgeon. He or she will talk with you about your choices.  Preventing knee pain  Losing weight and correcting excess muscle tightness or muscle weakness may help lower your risk.  In some cases, you can prevent knee pain. To help prevent a flare-up of knee pain, you do these things:    Regularly do all the exercises your doctor or physical therapist advises    Support your knee as advised by your doctor or physical therapist    Increase training gradually, and ease up on training when needed    Have an expert check your gait for running or other sporting activities    Stretch properly before and after exercise    Replace your running shoes regularly    Lose excess weight     When to call your healthcare provider  Call your healthcare provider right away if:    Your symptoms don t get better after a few weeks of treatment    You have any new symptoms   Date Last Reviewed: 4/1/2017 2000-2017 [employer]. [address] [place] [address]. All rights reserved. This information is not intended as a substitute for professional medical care. Always follow your healthcare professional's instructions.                Follow-ups after your visit        Additional Services     ORTHO  REFERRAL       [facility] is referring you to the Orthopedic  Services at [facility].       The  Representative will assist you in the coordination of your Orthopedic and Musculoskeletal Care as prescribed by your physician.    The   Representative will call you within 1 business day to help schedule your appointment, or you may contact the  Representative at:    All areas ~ [phone]     Type of Referral : Non Surgical   Fell on ice about a week and half ago. Still having severe pain .possible meniscal tear       Timeframe requested: 1 - 2 days    Coverage of these services is subject to the terms and limitations of your health insurance plan.  Please call member services at your health plan with any benefit or coverage questions.      If X-rays, CT or MRI's have been performed, please contact the facility where they were done to arrange for , prior to your scheduled appointment.  Please bring this referral request to your appointment and present it to your specialist.                  Who to contact     If you have questions or need follow up information about today's clinic visit or your schedule please contact [facility] directly at [phone].  Normal or non-critical lab and imaging results will be communicated to you by MyChart, letter or phone within 4 business days after the clinic has received the results. If you do not hear from us within 7 days, please contact the clinic through [employer] or phone. If you have a critical or abnormal lab result, we will notify you by phone as soon as possible.  Submit refill requests through [employer] or call your pharmacy and they will forward the refill request to us. Please allow 3 business days for your refill to be completed.          Additional Information About Your Visit        [employer] Information     [employer] gives you secure access to your electronic health record. If you see a primary care provider, you can also send messages to your care team and make appointments. If you have questions, please call your primary care clinic.  If you do not have a primary care provider, please call [phone] and they will assist you.        Care EveryWhere ID     This is  "your Care EveryWhere ID. This could be used by other organizations to access your [place] medical records  [id number]        Your Vitals Were     Pulse Temperature Height BMI (Body Mass Index)          86 97.2  F (36.2  C) (Tympanic) 5' 10.5\" (1.791 m) 48.8 kg/m2         Blood Pressure from Last 3 Encounters:   04/23/18 121/67   04/15/18 158/81   03/02/17 134/83    Weight from Last 3 Encounters:   04/23/18 (!) 345 lb (156.5 kg)   03/02/17 237 lb (107.5 kg)   04/25/16 280 lb (127 kg)              We Performed the Following     ORTHO  REFERRAL        Primary Care Provider Office Phone # Fax #    Antoni Palmer [phone] [address] [facility] [address]        Equal Access to Services     JAMIE GOODRICH : Hadii aad ku hadasho [employer], waaxda luqadaha, qaybta kaalmada adeegyada, waxay joselito king . So [facility] [phone].    ATENCIÓN: Si habla español, tiene a ko disposición servicios gratuitos de asistencia lingüística. Uvaldomicah al [phone].    We comply with applicable federal civil rights laws and [place] laws. We do not discriminate on the basis of race, color, national origin, age, disability, sex, sexual orientation, or gender identity.            Thank you!     Thank you for choosing [facility]  for your care. Our goal is always to provide you with excellent care. Hearing back from our patients is one way we can continue to improve our services. Please take a few minutes to complete the written survey that you may receive in the mail after your visit with us. Thank you!             Your Updated Medication List - Protect others around you: Learn how to safely use, store and throw away your medicines at www.disposemymeds.org.          This list is accurate as of 4/23/18  7:22 AM.  Always use your most recent med list.                   Brand Name Dispense Instructions for use Diagnosis    IBUPROFEN PO      Take 400 mg by mouth " every 6 hours as needed for moderate pain

## 2018-04-26 ENCOUNTER — RADIANT APPOINTMENT (OUTPATIENT)
Dept: GENERAL RADIOLOGY | Facility: CLINIC | Age: 43
End: 2018-04-26
Attending: PEDIATRICS

## 2018-04-26 ENCOUNTER — OFFICE VISIT (OUTPATIENT)
Dept: ORTHOPEDICS | Facility: CLINIC | Age: 43
End: 2018-04-26

## 2018-04-26 VITALS
DIASTOLIC BLOOD PRESSURE: 68 MMHG | SYSTOLIC BLOOD PRESSURE: 124 MMHG | WEIGHT: 293 LBS | BODY MASS INDEX: 41.02 KG/M2 | HEIGHT: 71 IN

## 2018-04-26 DIAGNOSIS — S89.91XA INJURY OF RIGHT KNEE, INITIAL ENCOUNTER: Primary | ICD-10-CM

## 2018-04-26 DIAGNOSIS — S89.91XA INJURY OF RIGHT KNEE, INITIAL ENCOUNTER: ICD-10-CM

## 2018-04-26 PROCEDURE — 73562 X-RAY EXAM OF KNEE 3: CPT | Mod: RT

## 2018-04-26 PROCEDURE — 99244 OFF/OP CNSLTJ NEW/EST MOD 40: CPT | Performed by: PEDIATRICS

## 2018-04-26 NOTE — PROGRESS NOTES
Sports Medicine Clinic Visit    PCP: Antoni Palmer HERRERA Connolly is a 43 year old female who is seen  in consultation at the request of  Antoni Palmer M.D. presenting with right knee injury/pain.    Injury: Patient reports an injury 1.5 weeks ago.  She slipped on ice and fell, twisting right knee and landing on bend knee.  Has had pain since then, mostly with transitions.    Location of Pain: right knee  Duration of Pain: 4/14/18  Rating of Pain at worst: 10/10  Rating of Pain Currently: 4/10  Symptoms are better with: Brace, crutch as needed to transfer positions  Symptoms are worse with: stairs, sitting to standing, twisting  Additional Features:   Positive: Aching pain, intermittent sharp pain   Negative: swelling, bruising, popping, grinding, catching, locking, instability, paresthesias, numbness, weakness, pain in other joints and systemic symptoms  Other evaluation and/or treatments so far consists of: Ice, Ibuprofen, Rest, Elevation and brace and crutch  Prior History of related problems: None    Social History: , desk work    Review of Systems  Skin: no bruising, mild swelling  Musculoskeletal: as above  Neurologic: no numbness, paresthesias  Remainder of review of systems is negative including constitutional, CV, pulmonary, GI, except as noted in HPI or medical history.    Patient's current problem list, past medical and surgical history, and family history were reviewed.    Patient Active Problem List   Diagnosis     Internal hemorrhoids with complication     No past medical history on file.  Past Surgical History:   Procedure Laterality Date     EXAM UNDER ANESTHESIA RECTUM N/A 4/5/2016    Procedure: EXAM UNDER ANESTHESIA RECTUM;  Surgeon: Arci Eason MD;  Location: [facility] OR     HEMORRHOIDECTOMY INTERNAL  5/16/2014    Procedure: HEMORRHOIDECTOMY INTERNAL;  Surgeon: Myriam Joshua MD;  Location: [facility] OR     No family history on file.      Objective  BP  "124/68 (BP Location: Right arm, Patient Position: Sitting, Cuff Size: Adult Large)  Ht 5' 10.5\" (1.791 m)  Wt (!) 345 lb (156.5 kg)  BMI 48.8 kg/m2    GENERAL APPEARANCE: healthy, alert, no distress and over weight   GAIT: antalgic and crutches  SKIN: no suspicious lesions or rashes  HEENT: Sclera clear, anicteric  CV: good peripheral pulses  RESP: Breathing not labored  NEURO: Normal strength and tone, mentation intact and speech normal  PSYCH:  mentation appears normal and affect normal/bright    Bilateral Knee exam    Inspection:      Likely effusion right (though difficult exam with body habitus)    Patella:      Mobility -       hypomobile right    Tender:      medial patellar border right       medial joint line right    Non Tender:      remainder of knee area right    Knee ROM:      Range of motion limited by pain and swelling right    Strength:      4/5 with knee extension right    Special Tests:     positive (+) Cookie right       neg (-) anterior drawer right       neg (-) posterior drawer right       neg (-) varus at 0 deg and 30 deg right       neg (-) valgus at 0 deg and 30 deg right    Gait:      antalgic gait    Neurovascular:      2+ peripheral pulses bilaterally and brisk capillary refill       sensation grossly intact    Radiology  I ordered, visualized and reviewed these images with the patient  Bilateral sunrise XR views of knees reviewed: no acute bony abnormality, mild- moderate patellofemoral degenerative change  - will follow official read    I visualized and reviewed these images with the patient    Recent Results (from the past 744 hour(s))   XR Knee Right 3 Views    Narrative    RIGHT KNEE TWO VIEWS   4/15/2018 11:34 AM     HISTORY:  Medial knee joint-line tenderness after fall.     COMPARISON: None.      Impression    IMPRESSION: No evidence of fracture. 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    SHON NAPIER MD         Assessment:  1. " Injury of right knee, initial encounter      Right knee injury with likely effusion.  Discussed differential including exacerbation of underlying arthritis or meniscal tear.  We discussed the following treatment options: symptom treatment, activity modification/rest, imaging, rehab and referral. Following discussion, plan: recommend continues supportive care, patient will consider MRI, could also consider US guided procedure or formal PT.      Plan:  - Today's Plan of Care:  MRI of the right knee--Call [phone] to schedule MRI  Rehab: Home Exercise Program  Rest, ice, compression, elevation  Partial weight bearing    -We also discussed other future treatment options:  MRI or US guided injection or PT    Follow Up: 1-2 weeks as needed    Concerning signs and symptoms were reviewed.  The patient expressed understanding of this management plan and all questions were answered at this time.    Thanks for the opportunity to participate in the care of this patient, I will keep you updated on their progress.    CC: Antoni Crouch MD [facility]  Primary Care Sports Medicine  [facility]

## 2018-04-26 NOTE — MR AVS SNAPSHOT
After Visit Summary   4/26/2018    Cristo Connolly    MRN: [id number]           Patient Information     Date Of Birth          1975        Visit Information        Provider Department      4/26/2018 9:00 AM Marilyn Crouch MD [facility]        Today's Diagnoses     Injury of right knee, initial encounter    -  1      Care Instructions    Plan:  - Today's Plan of Care:  MRI of the right knee--Call [phone] to schedule MRI  Rehab: Home Exercise Program  Rest, ice, compression, elevation  Partial weight bearing    -We also discussed other future treatment options:  MRI or US guided injection or PT    Follow Up: 1-2 weeks as needed    If you have any further questions for your physician or physician s care team you can call [phone] and use option 3 to leave a voice message. Calls received during business hours will be returned same day.    For questions about the cost of your visit, or the cost of any procedure, lab or imaging study, please contact our [employer] LINE at [phone] for an estimate.  You may also contact them at [email or web address]     You will be asked to provide your name, birthdate, address, phone number, and insurance information.  You will also need to know the name of any specific test or procedure which is planned.  This often requires the CPT (common procedural terminology) code for the test or procedure.  Our clinic staff can help you get that information, if needed.    For information about how your insurance will cover your clinic visit, please call the customer service number on your insurance card.      Heel Slides    This exercise is for an injured right knee. Switch sides if the injury is to your left knee.  1. Sit on the floor with your legs straight in front of you.  2. Slide your right heel along the floor toward you, bending your knee and keeping your foot flexed.  3. Move it as close to you as you comfortably can.  Hold for 5 to 10 seconds. Then slide your heel back.  4. Repeat 5 times, or as instructed.     Tip: If you re sitting on carpet, put a plastic bag under your heel to make it slide more easily. If you re sitting on a hard floor, put a small towel under your heel.   Date Last Reviewed: 3/10/2016    [id number] [employer]. [address] [place] [address]. All rights reserved. This information is not intended as a substitute for professional medical care. Always follow your healthcare professional's instructions.        Quad Set for Leg and Knee    This exercise is designed to stretch and strengthen your knee. Before beginning, read through all the instructions. While exercising, breathe normally and use smooth movements. If you feel any pain, stop the exercise. If pain persists, call your healthcare provider.  1.  Sit on the floor with one leg straight, the other bent.  2.  Flex the foot of your straight leg by pointing your toes toward you. Press the back of your knee into the floor while tightening the muscle on the top of your thigh. Hold for ______ seconds. Then relax.  3.  Repeat ______ times. Do ______ sets a day.  Caution    Don t arch your back.    Don t hunch your shoulders.  Date Last Reviewed: 9/20/2015 2000-2017 [employer]. [address] [place] [address]. All rights reserved. This information is not intended as a substitute for professional medical care. Always follow your healthcare professional's instructions.        Knee Rehabilitation: Straight Leg Raises    Begin your rehabilitation with exercises that develop muscle control. These help you meet basic goals, like driving a car or going back to work. Exercise as often as you re advised. But stop right away if any exercise causes sharp or increasing pain. Icing your knee for 15 to 20 minutes after exercise can help prevent swelling and soreness.    Sit or lie on the floor with your injured leg straight  and the other leg bent. Point the toes on your injured leg straight up.    Raise your injured leg a few inches off the floor.    Hold for 10 seconds. Repeat 5 times. Rest for a minute, and then do another set. Do 2 to 3 sessions per day.  Note: Do this exercise with toes turned out to strengthen inner thigh muscles.  Date Last Reviewed: 8/16/2015 2000-2017 [employer]. [address] [place] [address], [place] [address]. All rights reserved. This information is not intended as a substitute for professional medical care. Always follow your healthcare professional's instructions.                Follow-ups after your visit        Future tests that were ordered for you today     Open Future Orders        Priority Expected Expires Ordered    MR Knee Right w/o Contrast Routine  4/26/2019 4/26/2018            Who to contact     If you have questions or need follow up information about today's clinic visit or your schedule please contact [facility] directly at [phone].  Normal or non-critical lab and imaging results will be communicated to you by U-NOTEhart, letter or phone within 4 business days after the clinic has received the results. If you do not hear from us within 7 days, please contact the clinic through [employer] or phone. If you have a critical or abnormal lab result, we will notify you by phone as soon as possible.  Submit refill requests through [employer] or call your pharmacy and they will forward the refill request to us. Please allow 3 business days for your refill to be completed.          Additional Information About Your Visit        Zonder Information     [employer] gives you secure access to your electronic health record. If you see a primary care provider, you can also send messages to your care team and make appointments. If you have questions, please call your primary care clinic.  If you do not have a primary care provider, please call [phone] and they will  "assist you.        Care EveryWhere ID     This is your Care EveryWhere ID. This could be used by other organizations to access your [place] medical records  [id number]        Your Vitals Were     Height BMI (Body Mass Index)                5' 10.5\" (1.791 m) 48.8 kg/m2           Blood Pressure from Last 3 Encounters:   04/26/18 124/68   04/23/18 121/67   04/15/18 158/81    Weight from Last 3 Encounters:   04/26/18 (!) 345 lb (156.5 kg)   04/23/18 (!) 345 lb (156.5 kg)   03/02/17 237 lb (107.5 kg)               Primary Care Provider Office Phone # Fax #    Antoni Palmer [phone] [address] [facility] [address]        Equal Access to Services     [employer] GOOD[employer] : Hadii fadia black hadasho [employer], waaxda luqadaha, qaybta kaalmada adeegyada, azul king . So [facility] [phone].    ATENCIÓN: Si habla español, tiene a ko disposición servicios gratuitos de asistencia lingüística. Llame al [phone].    We comply with applicable federal civil rights laws and [place] laws. We do not discriminate on the basis of race, color, national origin, age, disability, sex, sexual orientation, or gender identity.            Thank you!     Thank you for choosing [facility]  for your care. Our goal is always to provide you with excellent care. Hearing back from our patients is one way we can continue to improve our services. Please take a few minutes to complete the written survey that you may receive in the mail after your visit with us. Thank you!             Your Updated Medication List - Protect others around you: Learn how to safely use, store and throw away your medicines at www.disposemymeds.org.          This list is accurate as of 4/26/18 10:00 AM.  Always use your most recent med list.                   Brand Name Dispense Instructions for use Diagnosis    IBUPROFEN PO      Take 400 mg by mouth every 6 hours as needed for " moderate pain        order for DME     1 Units    Equipment being ordered: Knee sleeve 2 XL    Acute pain of right knee

## 2018-04-26 NOTE — PATIENT INSTRUCTIONS
Plan:  - Today's Plan of Care:  MRI of the right knee--Call [phone] to schedule MRI  Rehab: Home Exercise Program  Rest, ice, compression, elevation  Partial weight bearing    -We also discussed other future treatment options:  MRI or US guided injection or PT    Follow Up: 1-2 weeks as needed    If you have any further questions for your physician or physician s care team you can call [phone] and use option 3 to leave a voice message. Calls received during business hours will be returned same day.    For questions about the cost of your visit, or the cost of any procedure, lab or imaging study, please contact our [employer] PRICE LINE at [phone] for an estimate.  You may also contact them at [email or web address]     You will be asked to provide your name, birthdate, address, phone number, and insurance information.  You will also need to know the name of any specific test or procedure which is planned.  This often requires the CPT (common procedural terminology) code for the test or procedure.  Our clinic staff can help you get that information, if needed.    For information about how your insurance will cover your clinic visit, please call the customer service number on your insurance card.      Heel Slides    This exercise is for an injured right knee. Switch sides if the injury is to your left knee.  1. Sit on the floor with your legs straight in front of you.  2. Slide your right heel along the floor toward you, bending your knee and keeping your foot flexed.  3. Move it as close to you as you comfortably can. Hold for 5 to 10 seconds. Then slide your heel back.  4. Repeat 5 times, or as instructed.     Tip: If you re sitting on carpet, put a plastic bag under your heel to make it slide more easily. If you re sitting on a hard floor, put a small towel under your heel.   Date Last Reviewed: 3/10/2016    5344-7745 [employer]. [address] [place] [address], [place] [address]. All rights  reserved. This information is not intended as a substitute for professional medical care. Always follow your healthcare professional's instructions.        Quad Set for Leg and Knee    This exercise is designed to stretch and strengthen your knee. Before beginning, read through all the instructions. While exercising, breathe normally and use smooth movements. If you feel any pain, stop the exercise. If pain persists, call your healthcare provider.  1.  Sit on the floor with one leg straight, the other bent.  2.  Flex the foot of your straight leg by pointing your toes toward you. Press the back of your knee into the floor while tightening the muscle on the top of your thigh. Hold for ______ seconds. Then relax.  3.  Repeat ______ times. Do ______ sets a day.  Caution    Don t arch your back.    Don t hunch your shoulders.  Date Last Reviewed: 9/20/2015 2000-2017 [employer]. [address] [place] [address]. All rights reserved. This information is not intended as a substitute for professional medical care. Always follow your healthcare professional's instructions.        Knee Rehabilitation: Straight Leg Raises    Begin your rehabilitation with exercises that develop muscle control. These help you meet basic goals, like driving a car or going back to work. Exercise as often as you re advised. But stop right away if any exercise causes sharp or increasing pain. Icing your knee for 15 to 20 minutes after exercise can help prevent swelling and soreness.    Sit or lie on the floor with your injured leg straight and the other leg bent. Point the toes on your injured leg straight up.    Raise your injured leg a few inches off the floor.    Hold for 10 seconds. Repeat 5 times. Rest for a minute, and then do another set. Do 2 to 3 sessions per day.  Note: Do this exercise with toes turned out to strengthen inner thigh muscles.  Date Last Reviewed: 8/16/2015 2000-2017 [employer]  [address] [place] [address]. All rights reserved. This information is not intended as a substitute for professional medical care. Always follow your healthcare professional's instructions.

## 2018-04-26 NOTE — LETTER
4/26/2018         RE: Cristo Connolly  [address]  [place] [address]        Dear Colleague,    Thank you for referring your patient, Cristo Connolly, to the [facility]. Please see a copy of my visit note below.    Sports Medicine Clinic Visit    PCP: Antoni Palmer    Cristo Connolly is a 43 year old female who is seen  in consultation at the request of  Antoni Palmer M.D. presenting with right knee injury/pain.    Injury: Patient reports an injury 1.5 weeks ago.  She slipped on ice and fell, twisting right knee and landing on bend knee.  Has had pain since then, mostly with transitions.    Location of Pain: right knee  Duration of Pain: 4/14/18  Rating of Pain at worst: 10/10  Rating of Pain Currently: 4/10  Symptoms are better with: Brace, crutch as needed to transfer positions  Symptoms are worse with: stairs, sitting to standing, twisting  Additional Features:   Positive: Aching pain, intermittent sharp pain   Negative: swelling, bruising, popping, grinding, catching, locking, instability, paresthesias, numbness, weakness, pain in other joints and systemic symptoms  Other evaluation and/or treatments so far consists of: Ice, Ibuprofen, Rest, Elevation and brace and crutch  Prior History of related problems: None    Social History: , desk work    Review of Systems  Skin: no bruising, mild swelling  Musculoskeletal: as above  Neurologic: no numbness, paresthesias  Remainder of review of systems is negative including constitutional, CV, pulmonary, GI, except as noted in HPI or medical history.    Patient's current problem list, past medical and surgical history, and family history were reviewed.    Patient Active Problem List   Diagnosis     Internal hemorrhoids with complication     No past medical history on file.  Past Surgical History:   Procedure Laterality Date     EXAM UNDER ANESTHESIA RECTUM N/A 4/5/2016     "Procedure: EXAM UNDER ANESTHESIA RECTUM;  Surgeon: Aric Eason MD;  Location: [facility]     HEMORRHOIDECTOMY INTERNAL  5/16/2014    Procedure: HEMORRHOIDECTOMY INTERNAL;  Surgeon: Myriam Joshua MD;  Location: [facility] OR     No family history on file.      Objective  /68 (BP Location: Right arm, Patient Position: Sitting, Cuff Size: Adult Large)  Ht 5' 10.5\" (1.791 m)  Wt (!) 345 lb (156.5 kg)  BMI 48.8 kg/m2    GENERAL APPEARANCE: healthy, alert, no distress and over weight   GAIT: antalgic and crutches  SKIN: no suspicious lesions or rashes  HEENT: Sclera clear, anicteric  CV: good peripheral pulses  RESP: Breathing not labored  NEURO: Normal strength and tone, mentation intact and speech normal  PSYCH:  mentation appears normal and affect normal/bright    Bilateral Knee exam    Inspection:      Likely effusion right (though difficult exam with body habitus)    Patella:      Mobility -       hypomobile right    Tender:      medial patellar border right       medial joint line right    Non Tender:      remainder of knee area right    Knee ROM:      Range of motion limited by pain and swelling right    Strength:      4/5 with knee extension right    Special Tests:     positive (+) Cookie right       neg (-) anterior drawer right       neg (-) posterior drawer right       neg (-) varus at 0 deg and 30 deg right       neg (-) valgus at 0 deg and 30 deg right    Gait:      antalgic gait    Neurovascular:      2+ peripheral pulses bilaterally and brisk capillary refill       sensation grossly intact    Radiology  I ordered, visualized and reviewed these images with the patient  Bilateral sunrise XR views of knees reviewed: no acute bony abnormality, mild- moderate patellofemoral degenerative change  - will follow official read    I visualized and reviewed these images with the patient    Recent Results (from the past 744 hour(s))   XR Knee Right 3 Views    Narrative    RIGHT KNEE TWO VIEWS   4/15/2018 " 11:34 AM     HISTORY:  Medial knee joint-line tenderness after fall.     COMPARISON: None.      Impression    IMPRESSION: No evidence of fracture. 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    SHON NAPIER MD         Assessment:  1. Injury of right knee, initial encounter      Right knee injury with likely effusion.  Discussed differential including exacerbation of underlying arthritis or meniscal tear.  We discussed the following treatment options: symptom treatment, activity modification/rest, imaging, rehab and referral. Following discussion, plan: recommend continues supportive care, patient will consider MRI, could also consider US guided procedure or formal PT.      Plan:  - Today's Plan of Care:  MRI of the right knee--Call [phone] to schedule MRI  Rehab: Home Exercise Program  Rest, ice, compression, elevation  Partial weight bearing    -We also discussed other future treatment options:  MRI or US guided injection or PT    Follow Up: 1-2 weeks as needed    Concerning signs and symptoms were reviewed.  The patient expressed understanding of this management plan and all questions were answered at this time.    Thanks for the opportunity to participate in the care of this patient, I will keep you updated on their progress.    CC: Antoni Crouch MD CAQ  Primary Care Sports Medicine  [facility]    Again, thank you for allowing me to participate in the care of your patient.        Sincerely,        Marilyn Crouch MD

## 2018-06-27 ENCOUNTER — TELEPHONE (OUTPATIENT)
Dept: FAMILY MEDICINE | Facility: CLINIC | Age: 43
End: 2018-06-27

## 2019-07-10 ENCOUNTER — OFFICE VISIT (OUTPATIENT)
Dept: FAMILY MEDICINE | Facility: CLINIC | Age: 44
End: 2019-07-10
Payer: COMMERCIAL

## 2019-07-10 VITALS
WEIGHT: 293 LBS | HEART RATE: 89 BPM | RESPIRATION RATE: 14 BRPM | OXYGEN SATURATION: 96 % | TEMPERATURE: 98.6 F | DIASTOLIC BLOOD PRESSURE: 76 MMHG | HEIGHT: 70 IN | SYSTOLIC BLOOD PRESSURE: 112 MMHG | BODY MASS INDEX: 41.95 KG/M2

## 2019-07-10 DIAGNOSIS — H10.023 PINK EYE DISEASE OF BOTH EYES: ICD-10-CM

## 2019-07-10 DIAGNOSIS — J02.9 SORE THROAT: Primary | ICD-10-CM

## 2019-07-10 DIAGNOSIS — R05.9 COUGH: ICD-10-CM

## 2019-07-10 DIAGNOSIS — E66.01 MORBID OBESITY (H): ICD-10-CM

## 2019-07-10 LAB
DEPRECATED S PYO AG THROAT QL EIA: NORMAL
SPECIMEN SOURCE: NORMAL

## 2019-07-10 PROCEDURE — 87880 STREP A ASSAY W/OPTIC: CPT | Performed by: FAMILY MEDICINE

## 2019-07-10 PROCEDURE — 99214 OFFICE O/P EST MOD 30 MIN: CPT | Performed by: FAMILY MEDICINE

## 2019-07-10 PROCEDURE — 87081 CULTURE SCREEN ONLY: CPT | Performed by: FAMILY MEDICINE

## 2019-07-10 RX ORDER — CODEINE PHOSPHATE AND GUAIFENESIN 10; 100 MG/5ML; MG/5ML
1-2 SOLUTION ORAL EVERY 4 HOURS PRN
Qty: 120 ML | Refills: 0 | Status: SHIPPED | OUTPATIENT
Start: 2019-07-10 | End: 2024-02-29

## 2019-07-10 RX ORDER — BENZONATATE 100 MG/1
100 CAPSULE ORAL 3 TIMES DAILY PRN
COMMUNITY
End: 2024-02-29

## 2019-07-10 RX ORDER — PREDNISONE 20 MG/1
40 TABLET ORAL DAILY
Qty: 10 TABLET | Refills: 0 | Status: SHIPPED | OUTPATIENT
Start: 2019-07-10 | End: 2019-07-15

## 2019-07-10 RX ORDER — POLYMYXIN B SULFATE AND TRIMETHOPRIM 1; 10000 MG/ML; [USP'U]/ML
1-2 SOLUTION OPHTHALMIC EVERY 4 HOURS
Qty: 1 BOTTLE | Refills: 0 | Status: SHIPPED | OUTPATIENT
Start: 2019-07-10 | End: 2024-02-29

## 2019-07-10 ASSESSMENT — MIFFLIN-ST. JEOR: SCORE: 2296.19

## 2019-07-10 NOTE — PROGRESS NOTES
Subjective     Cristo Connolly is a 44 year old female who presents to clinic today for the following health issues:    44 yr old female here for cough and pink eye . Started a couple of weeks ago. Cough is deep and dry with hardly any sputum production. She denies any shortness of breath, denies any wheezing . She does not smoke.    HPI   Acute Illness   Acute illness concerns: cough pink eye   Onset: 1 week     Fever: no    Chills/Sweats: no    Headache (location?): YES    Sinus Pressure:no    Conjunctivitis: yes both red     Ear Pain: no    Rhinorrhea: no    Congestion: no    Sore Throat: YES     Cough: YES-productive of green sputum    Wheeze: no    Decreased Appetite: YES    Nausea: no    Vomiting: no    Diarrhea:  no    Dysuria/Freq.: no    Fatigue/Achiness: no    Sick/Strep Exposure: no     Therapies Tried and outcome: patient had virtual visit was given tessalon and pink eye drops still is having trouble          Patient Active Problem List   Diagnosis     Internal hemorrhoids with complication     Morbid obesity (H)     Past Surgical History:   Procedure Laterality Date     EXAM UNDER ANESTHESIA RECTUM N/A 4/5/2016    Procedure: EXAM UNDER ANESTHESIA RECTUM;  Surgeon: Aric Eason MD;  Location: [facility]     HEMORRHOIDECTOMY INTERNAL  5/16/2014    Procedure: HEMORRHOIDECTOMY INTERNAL;  Surgeon: Myriam Joshua MD;  Location: [facility]       Social History     Tobacco Use     Smoking status: Never Smoker     Smokeless tobacco: Never Used   Substance Use Topics     Alcohol use: No     History reviewed. No pertinent family history.      Current Outpatient Medications   Medication Sig Dispense Refill     benzonatate (TESSALON) 100 MG capsule Take 100 mg by mouth 3 times daily as needed for cough       guaiFENesin-codeine (ROBITUSSIN AC) 100-10 MG/5ML solution Take 5-10 mLs by mouth every 4 hours as needed for cough 120 mL 0     IBUPROFEN PO Take 400 mg by mouth every 6 hours as needed for moderate  "pain       OVER-THE-COUNTER Place Into the left eye 4 times daily       predniSONE (DELTASONE) 20 MG tablet Take 2 tablets (40 mg) by mouth daily for 5 days 10 tablet 0     trimethoprim-polymyxin b (POLYTRIM) 12503-3.1 UNIT/ML-% ophthalmic solution Place 1-2 drops into both eyes every 4 hours 1 Bottle 0     order for DME Equipment being ordered: Knee sleeve 2 XL 1 Units 0     Allergies   Allergen Reactions     Quinine Itching     BP Readings from Last 3 Encounters:   07/10/19 112/76   04/26/18 124/68   04/23/18 121/67    Wt Readings from Last 3 Encounters:   07/10/19 (!) 156.5 kg (345 lb)   04/26/18 (!) 156.5 kg (345 lb)   04/23/18 (!) 156.5 kg (345 lb)                      Reviewed and updated as needed this visit by Provider  Tobacco  Allergies  Meds  Problems  Med Hx  Surg Hx  Fam Hx         Review of Systems   ROS COMP: Constitutional, HEENT, cardiovascular, pulmonary, gi and gu systems are negative, except as otherwise noted.      Objective    /76   Pulse 89   Temp 98.6  F (37  C) (Tympanic)   Resp 14   Ht 1.772 m (5' 9.75\")   Wt (!) 156.5 kg (345 lb)   SpO2 96%   BMI 49.86 kg/m    Body mass index is 49.86 kg/m .  Physical Exam   GENERAL: healthy, alert and no distress  EYES: Eyes grossly normal to inspection, PERRL and conjunctivae and sclerae abnormal   HENT: ear canals and TM's normal, nose and mouth without ulcers or lesions  NECK: no adenopathy, no asymmetry, masses, or scars and thyroid normal to palpation  RESP: lungs clear to auscultation - no rales, rhonchi or wheezes  CV: regular rate and rhythm, normal S1 S2, no S3 or S4, no murmur, click or rub, no peripheral edema and peripheral pulses strong  MS: no gross musculoskeletal defects noted, no edema  SKIN: no suspicious lesions or rashes  PSYCH: mentation appears normal, affect normal/bright    Diagnostic Test Results:  Results for orders placed or performed in visit on 07/10/19   Rapid strep screen   Result Value Ref Range    " Specimen Description Throat     Rapid Strep A Screen       NEGATIVE: No Group A streptococcal antigen detected by immunoassay, await culture report.   Beta strep group A culture   Result Value Ref Range    Specimen Description Throat     Culture Micro No beta hemolytic Streptococcus Group A isolated            Assessment & Plan     1. Sore throat  Strep negative   - Rapid strep screen  - Beta strep group A culture    2. Cough  Likely viral ,recommend prednisone and cough syrup , if symptoms persist she is asked to call the clinic  - predniSONE (DELTASONE) 20 MG tablet; Take 2 tablets (40 mg) by mouth daily for 5 days  Dispense: 10 tablet; Refill: 0  - guaiFENesin-codeine (ROBITUSSIN AC) 100-10 MG/5ML solution; Take 5-10 mLs by mouth every 4 hours as needed for cough  Dispense: 120 mL; Refill: 0    3. Pink eye disease of both eyes  - trimethoprim-polymyxin b (POLYTRIM) 31568-6.1 UNIT/ML-% ophthalmic solution; Place 1-2 drops into both eyes every 4 hours  Dispense: 1 Bottle; Refill: 0    4. Morbid obesity (H)            FUTURE APPOINTMENTS:       - Follow-up visit in one week or sooner as needed.    Return in about 1 week (around 7/17/2019) for Routine Visit.    Antoni Palmer MD  [facility]

## 2019-07-10 NOTE — PATIENT INSTRUCTIONS
Thank you for choosing [facility].  You may be receiving an email and/or telephone survey request from [facility] Customer Experience regarding your visit today.  Please take a few minutes to respond to the survey to let us know how we are doing.      If you have questions or concerns, please contact us via [employer] or you can contact your care team at [phone].    Our Clinic hours are:  Monday 6:40 am  to 7:00 pm  Tuesday -Friday 6:40 am to 5:00 pm    The [facility] outpatient lab hours are:  Monday - Friday 6:10 am to 4:45 pm  Saturdays 7:00 am to 11:00 am  Appointments are required, call [phone]    If you have clinical questions after hours or would like to schedule an appointment,  call the clinic at [phone].  Patient Education     Viral Bronchitis (Adult)    You have a viral bronchitis. Bronchitis is inflammation and swelling of the lining of the lungs. This is often caused by an infection. Symptoms include a dry, hacking cough that is worse at night. The cough may bring up yellow-green mucus. You may also feel short of breath or wheeze. Other symptoms may include tiredness, chest discomfort, and chills.  Bronchitis that is caused by a virus is not treated with antibiotics. Instead, medicines may be given to help relieve symptoms. Symptoms can last up to 2 weeks, although the cough may last much longer.  This illness is contagious during the first few days and is spread through the air by coughing and sneezing, or by direct contact (touching the sick person and then touching your own eyes, nose, or mouth).  Most viral illnesses resolve within 10 to 14 days with rest and simple home remedies, although they may sometimes last for several weeks.  Home care    If symptoms are severe, rest at home for the first 2 to 3 days. When you go back to your usual activities, don't let yourself get too tired.    Do not smoke. Also avoid being exposed to secondhand smoke.    You may use over-the-counter  medicine to control fever or pain, unless another pain medicine was prescribed. If you have chronic liver or kidney disease or have ever had a stomach ulcer or gastrointestinal bleeding, talk with your healthcare provider before using these medicines. Also talk to your provider if you are taking medicine to prevent blood clots. Aspirin should never be given to anyone younger than 18 years of age who is ill with a viral infection or fever. It may cause severe liver or brain damage.    Your appetite may be poor, so a light diet is fine. Avoid dehydration by drinking 6 to 8 glasses of fluids per day (such as water, soft drinks, sports drinks, juices, tea, or soup). Extra fluids will help loosen secretions in the nose and lungs.    Over-the-counter cough, cold, and sore-throat medicines will not shorten the length of the illness, but they may help to reduce symptoms. Don't use decongestants if you have high blood pressure.  Follow-up care  Follow up with your healthcare provider, or as advised. If you had an X-ray or ECG (electrocardiogram), a specialist will review it. You will be notified of any new findings that may affect your care.  If you are age 65 or older, or if you have a chronic lung disease or condition that affects your immune system, or you smoke, ask your healthcare provider about getting a pneumococcal vaccine and a yearly flu shot (influenza vaccine).  When to seek medical advice  Call your healthcare provider right away if any of these occur:    Fever of 100.4 F (38 C) or higher, or as directed by your healthcare provider    Coughing up increased amounts of colored sputum    Weakness, drowsiness, headache, facial pain, ear pain, or a stiff neck  Call 911  Call 911 if any of these occur:    Coughing up blood    Worsening weakness, drowsiness, headache, or stiff neck    Trouble breathing, wheezing, or pain with breathing  Date Last Reviewed: 6/1/2018 2000-2018 [employer]. [address] [place] [address]  [place] [address]. All rights reserved. This information is not intended as a substitute for professional medical care. Always follow your healthcare professional's instructions.

## 2019-07-11 LAB
BACTERIA SPEC CULT: NORMAL
SPECIMEN SOURCE: NORMAL

## 2019-07-12 ENCOUNTER — MYC MEDICAL ADVICE (OUTPATIENT)
Dept: FAMILY MEDICINE | Facility: CLINIC | Age: 44
End: 2019-07-12

## 2019-07-12 DIAGNOSIS — J40 BRONCHITIS: Primary | ICD-10-CM

## 2019-07-12 RX ORDER — AZITHROMYCIN 250 MG/1
TABLET, FILM COATED ORAL
Qty: 6 TABLET | Refills: 0 | Status: SHIPPED | OUTPATIENT
Start: 2019-07-12 | End: 2024-02-29

## 2020-03-02 ENCOUNTER — HEALTH MAINTENANCE LETTER (OUTPATIENT)
Age: 45
End: 2020-03-02

## 2020-12-20 ENCOUNTER — HEALTH MAINTENANCE LETTER (OUTPATIENT)
Age: 45
End: 2020-12-20

## 2021-02-28 ENCOUNTER — HEALTH MAINTENANCE LETTER (OUTPATIENT)
Age: 46
End: 2021-02-28

## 2021-04-24 ENCOUNTER — HEALTH MAINTENANCE LETTER (OUTPATIENT)
Age: 46
End: 2021-04-24

## 2021-10-03 ENCOUNTER — HEALTH MAINTENANCE LETTER (OUTPATIENT)
Age: 46
End: 2021-10-03

## 2022-03-20 ENCOUNTER — HEALTH MAINTENANCE LETTER (OUTPATIENT)
Age: 47
End: 2022-03-20

## 2022-05-15 ENCOUNTER — HEALTH MAINTENANCE LETTER (OUTPATIENT)
Age: 47
End: 2022-05-15

## 2022-09-10 ENCOUNTER — HEALTH MAINTENANCE LETTER (OUTPATIENT)
Age: 47
End: 2022-09-10

## 2023-04-30 ENCOUNTER — HEALTH MAINTENANCE LETTER (OUTPATIENT)
Age: 48
End: 2023-04-30

## 2023-06-03 ENCOUNTER — HEALTH MAINTENANCE LETTER (OUTPATIENT)
Age: 48
End: 2023-06-03

## 2024-02-07 ENCOUNTER — ANCILLARY PROCEDURE (OUTPATIENT)
Dept: GENERAL RADIOLOGY | Facility: CLINIC | Age: 49
End: 2024-02-07
Attending: PHYSICIAN ASSISTANT
Payer: COMMERCIAL

## 2024-02-07 ENCOUNTER — OFFICE VISIT (OUTPATIENT)
Dept: URGENT CARE | Facility: URGENT CARE | Age: 49
End: 2024-02-07
Payer: COMMERCIAL

## 2024-02-07 VITALS
TEMPERATURE: 97.9 F | OXYGEN SATURATION: 98 % | HEART RATE: 83 BPM | DIASTOLIC BLOOD PRESSURE: 93 MMHG | WEIGHT: 293 LBS | BODY MASS INDEX: 56.36 KG/M2 | SYSTOLIC BLOOD PRESSURE: 157 MMHG

## 2024-02-07 DIAGNOSIS — R03.0 ELEVATED BLOOD PRESSURE READING WITHOUT DIAGNOSIS OF HYPERTENSION: ICD-10-CM

## 2024-02-07 DIAGNOSIS — R06.2 WHEEZING: ICD-10-CM

## 2024-02-07 DIAGNOSIS — R09.89 RHONCHI AT LEFT LUNG BASE: ICD-10-CM

## 2024-02-07 DIAGNOSIS — R09.89 RHONCHI AT LEFT LUNG BASE: Primary | ICD-10-CM

## 2024-02-07 PROCEDURE — 99204 OFFICE O/P NEW MOD 45 MIN: CPT | Performed by: PHYSICIAN ASSISTANT

## 2024-02-07 PROCEDURE — 71046 X-RAY EXAM CHEST 2 VIEWS: CPT | Mod: TC | Performed by: RADIOLOGY

## 2024-02-07 RX ORDER — DOXYCYCLINE 100 MG/1
100 CAPSULE ORAL 2 TIMES DAILY
Qty: 20 CAPSULE | Refills: 0 | Status: SHIPPED | OUTPATIENT
Start: 2024-02-07 | End: 2024-02-29

## 2024-02-07 RX ORDER — BENZONATATE 200 MG/1
200 CAPSULE ORAL 3 TIMES DAILY PRN
Qty: 21 CAPSULE | Refills: 0 | Status: SHIPPED | OUTPATIENT
Start: 2024-02-07 | End: 2024-02-14

## 2024-02-07 RX ORDER — ALBUTEROL SULFATE 90 UG/1
2 AEROSOL, METERED RESPIRATORY (INHALATION) EVERY 6 HOURS
Qty: 8.5 G | Refills: 0 | Status: SHIPPED | OUTPATIENT
Start: 2024-02-07 | End: 2024-02-29

## 2024-02-07 NOTE — PROGRESS NOTES
Assessment & Plan     Rhonchi at left lung base    Chest xray Negative for acute findings, read by Michael Engle PA-C [facility] at time of visit.    Bronchitis is inflammation of the bronchial tubes, which carry air to the lungs. The tubes swell and produce mucus, or phlegm. The mucus and inflamed bronchial tubes make you cough. You may have trouble breathing.  Most cases of bronchitis are caused by viruses but in your case we are more concerned about a bacterial infection. . Antibiotics usually are helpful in this situation to help you clear this bacterial infection.  Bronchitis usually develops rapidly and lasts about 2 to 3 weeks in otherwise healthy people.    - XR Chest 2 Views; Future  - benzonatate (TESSALON) 200 MG capsule; Take 1 capsule (200 mg) by mouth 3 times daily as needed  - doxycycline monohydrate (MONODOX) 100 MG capsule; Take 1 capsule (100 mg) by mouth 2 times daily    Elevated blood pressure reading without diagnosis of hypertension    Patient advised to follow up with PCP for recheck blood pressure   Information given to patient on diet modifications, including lowering salt in diet, decrease calories, weight loss and exercise.  Monitor blood pressure at home and if BP maintains over 140/90 then advise having a recheck with PCP in 2 weeks.       Wheezing    - albuterol (PROVENTIL HFA) 108 (90 Base) MCG/ACT inhaler; Inhale 2 puffs into the lungs every 6 hours    Review of external notes as documented elsewhere in note      At today's visit with Cristo Connolly , we discussed results, diagnosis, medications and formulated a plan.  We also discussed red flags for immediate return to clinic/ER, as well as indications for follow up with PCP if not improved in 3 days. Patient understood and agreed to plan. Cristo Connolly was discharged with stable vitals and has no further questions.       No follow-ups on file.    Subjective   Cristo is a 49 year old, presenting for the following health  issues:  Cough (X 2 weeks, comes and goes, wheezing, fever on Monday night. )    HPI     Review of Systems  Constitutional, neuro, ENT, endocrine, pulmonary, cardiac, gastrointestinal, genitourinary, musculoskeletal, integument and psychiatric systems are negative, except as otherwise noted.      Objective    BP (!) 157/93   Pulse 83   Temp 97.9  F (36.6  C) (Tympanic)   Wt (!) 176.9 kg (390 lb)   SpO2 98%   BMI 56.36 kg/m    Body mass index is 56.36 kg/m .  Physical Exam   GENERAL: alert and no distress  EYES: Eyes grossly normal to inspection, PERRL and conjunctivae and sclerae normal  HENT: ear canals and TM's normal, nose and mouth without ulcers or lesions  NECK: no adenopathy, no asymmetry, masses, or scars  RESP: rhonchi L mid posterior and expiratory wheezes L mid posterior  CV: regular rate and rhythm, normal S1 S2, no S3 or S4, no murmur, click or rub, no peripheral edema  MS: no gross musculoskeletal defects noted, no edema  SKIN: no suspicious lesions or rashes  NEURO: Normal strength and tone, mentation intact and speech normal  PSYCH: mentation appears normal, affect normal/bright    Xray - Reviewed and interpreted by me.  Negative for acute findings, read by Michael ANDRADE at time of visit.          Signed Electronically by: RAYMOND Vigil, VANE

## 2024-02-29 ENCOUNTER — OFFICE VISIT (OUTPATIENT)
Dept: FAMILY MEDICINE | Facility: CLINIC | Age: 49
End: 2024-02-29
Payer: COMMERCIAL

## 2024-02-29 VITALS
DIASTOLIC BLOOD PRESSURE: 86 MMHG | WEIGHT: 293 LBS | SYSTOLIC BLOOD PRESSURE: 122 MMHG | BODY MASS INDEX: 41.95 KG/M2 | OXYGEN SATURATION: 98 % | HEIGHT: 70 IN | TEMPERATURE: 98.4 F | RESPIRATION RATE: 16 BRPM | HEART RATE: 90 BPM

## 2024-02-29 DIAGNOSIS — Z12.31 VISIT FOR SCREENING MAMMOGRAM: ICD-10-CM

## 2024-02-29 DIAGNOSIS — E66.01 MORBID OBESITY WITH BMI OF 50.0-59.9, ADULT (H): ICD-10-CM

## 2024-02-29 DIAGNOSIS — Z00.00 ENCOUNTER FOR ROUTINE ADULT HEALTH EXAMINATION WITHOUT ABNORMAL FINDINGS: Primary | ICD-10-CM

## 2024-02-29 DIAGNOSIS — R06.2 WHEEZING: ICD-10-CM

## 2024-02-29 DIAGNOSIS — J30.9 CHRONIC ALLERGIC RHINITIS: ICD-10-CM

## 2024-02-29 DIAGNOSIS — Z12.11 SCREEN FOR COLON CANCER: ICD-10-CM

## 2024-02-29 LAB
ANION GAP SERPL CALCULATED.3IONS-SCNC: 9 MMOL/L (ref 7–15)
BUN SERPL-MCNC: 10.4 MG/DL (ref 6–20)
CALCIUM SERPL-MCNC: 9 MG/DL (ref 8.6–10)
CHLORIDE SERPL-SCNC: 107 MMOL/L (ref 98–107)
CHOLEST SERPL-MCNC: 149 MG/DL
CREAT SERPL-MCNC: 0.76 MG/DL (ref 0.51–0.95)
DEPRECATED HCO3 PLAS-SCNC: 24 MMOL/L (ref 22–29)
EGFRCR SERPLBLD CKD-EPI 2021: >90 ML/MIN/1.73M2
FASTING STATUS PATIENT QL REPORTED: YES
GLUCOSE SERPL-MCNC: 103 MG/DL (ref 70–99)
HDLC SERPL-MCNC: 53 MG/DL
LDLC SERPL CALC-MCNC: 86 MG/DL
NONHDLC SERPL-MCNC: 96 MG/DL
POTASSIUM SERPL-SCNC: 4 MMOL/L (ref 3.4–5.3)
SODIUM SERPL-SCNC: 140 MMOL/L (ref 135–145)
TRIGL SERPL-MCNC: 50 MG/DL

## 2024-02-29 PROCEDURE — 99396 PREV VISIT EST AGE 40-64: CPT | Performed by: FAMILY MEDICINE

## 2024-02-29 PROCEDURE — 80048 BASIC METABOLIC PNL TOTAL CA: CPT | Performed by: FAMILY MEDICINE

## 2024-02-29 PROCEDURE — 99214 OFFICE O/P EST MOD 30 MIN: CPT | Mod: 25 | Performed by: FAMILY MEDICINE

## 2024-02-29 PROCEDURE — 36415 COLL VENOUS BLD VENIPUNCTURE: CPT | Performed by: FAMILY MEDICINE

## 2024-02-29 PROCEDURE — 80061 LIPID PANEL: CPT | Performed by: FAMILY MEDICINE

## 2024-02-29 RX ORDER — FLUTICASONE PROPIONATE 50 MCG
1 SPRAY, SUSPENSION (ML) NASAL DAILY
Qty: 1 G | Refills: 0 | Status: SHIPPED | OUTPATIENT
Start: 2024-02-29

## 2024-02-29 RX ORDER — LEVOCETIRIZINE DIHYDROCHLORIDE 5 MG/1
5 TABLET, FILM COATED ORAL EVERY EVENING
Qty: 90 TABLET | Refills: 1 | Status: SHIPPED | OUTPATIENT
Start: 2024-02-29

## 2024-02-29 RX ORDER — ALBUTEROL SULFATE 90 UG/1
2 AEROSOL, METERED RESPIRATORY (INHALATION) EVERY 6 HOURS
Qty: 8.5 G | Refills: 11 | Status: SHIPPED | OUTPATIENT
Start: 2024-02-29

## 2024-02-29 NOTE — PROGRESS NOTES
"Preventive Care Visit  [facility]  Antoni Palmer MD, Family Medicine  Feb 29, 2024    Assessment & Plan     Encounter for routine adult health examination without abnormal findings  49 yr old female here for her annual physical. Lab order placed. Patient declined pap smear.   - Lipid panel reflex to direct LDL Non-fasting  - Basic metabolic panel  (Ca, Cl, CO2, Creat, Gluc, K, Na, BUN)    Visit for screening mammogram  Encouraged patient to schedule mammogram  - MA SCREENING DIGITAL BILAT - Future  (s+30)    Screen for colon cancer  - Fecal colorectal cancer screen (FIT)    Chronic allergic rhinitis  Medication faxed  - levocetirizine (XYZAL) 5 MG tablet  Dispense: 90 tablet; Refill: 1  - fluticasone (FLONASE) 50 MCG/ACT nasal spray  Dispense: 1 g; Refill: 0  - OFFICE/OUTPT VISIT,EST,LEVL III    Wheezing  Still had some expiratory wheezing on exam.   - albuterol (PROVENTIL HFA) 108 (90 Base) MCG/ACT inhaler  Dispense: 8.5 g; Refill: 11  - OFFICE/OUTPT VISIT,EST,LEVL III    Morbid obesity with BMI of 50.0-59.9, adult (H)  Patient encouraged to reduce calories.       Patient has been advised of split billing requirements and indicates understanding: Yes    Time spent above and beyond the physical was 30 minute in chart review in preparation to see patient, time with patient for interview/exam, ordering medications/tests/and/or procedures, and time spent in charting and coordinating care.      BMI  Estimated body mass index is 56.1 kg/m  as calculated from the following:    Height as of this encounter: 1.778 m (5' 10\").    Weight as of this encounter: 177.4 kg (391 lb).   Weight management plan: Discussed healthy diet and exercise guidelines    Counseling  Appropriate preventive services were discussed with this patient, including applicable screening as appropriate for fall prevention, nutrition, physical activity, Tobacco-use cessation, weight loss and cognition.  Checklist " reviewing preventive services available has been given to the patient.  Reviewed patient's diet, addressing concerns and/or questions.   Patient is at risk for social isolation and has been provided with information about the benefit of social connection.   The patient was instructed to see the dentist every 6 months.   She is at risk for psychosocial distress and has been provided with information to reduce risk.       FUTURE APPOINTMENTS:       - Follow-up visit as needed    Richelle Lemons is a 49 year old, presenting for the followin yr old female here for her annual physical. She has no significant past history except for morbid obesity. She says she is getting Semaglutide from a friend but she has only had this for two weeks and she has not seen the effect yet.  She was also seen in  a couple of weeks ago. She was seen for what sounded like a bronchitis. She says she is still wheezing a bit and she is still has a cough. She thinks she may have allergies as she has observed that certain things tend to aggravate the cough and it seems to get better when she takes an antihistamine.   Patient reports no fevers or chills.        Physical (General physical exam.  Declined pap smear.), UC Follow-Up (Follow up from  on 2024 for bronchitis.  She has finished her treatment and symptoms are still present.  She is still wheezing and has the cough.  States it almost feels like allergies as well.), and Imm/Inj (Will wait on injections until feeling better.)        2024     7:54 AM   Additional Questions   Roomed by Brianna Hebert CMA        Health Care Directive  Patient does not have a Health Care Directive or Living Will: Discussed advance care planning with patient; however, patient declined at this time.    HPI  Chief Complaint   Patient presents with    Physical     General physical exam.  Declined pap smear.     Follow-Up     Follow up from  on 2024 for bronchitis.  She has finished  her treatment and symptoms are still present.  She is still wheezing and has the cough.  States it almost feels like allergies as well.    Imm/Inj     Will wait on injections until feeling better.         ED/UC Followup:    Facility:  [facility]  Date of visit: 2-7-2024  Reason for visit: Cough  Current Status: She has finished her treatment and symptoms are still present.  She is still wheezing and has the cough.  States it almost feels like allergies as well.  Medication treatment didn't help very much.          2/29/2024   General Health   How would you rate your overall physical health? Good   Feel stress (tense, anxious, or unable to sleep) Only a little   (!) STRESS CONCERN      2/29/2024   Nutrition   Three or more servings of calcium each day? (!) I DON'T KNOW   Diet: Regular (no restrictions)   How many servings of fruit and vegetables per day? 4 or more   How many sweetened beverages each day? 0-1         2/29/2024   Exercise   Days per week of moderate/strenous exercise 6 days         2/29/2024   Social Factors   Frequency of gathering with friends or relatives Never   Worry food won't last until get money to buy more No   Food not last or not have enough money for food? No   Do you have housing?  Yes   Are you worried about losing your housing? No   Lack of transportation? No   Unable to get utilities (heat,electricity)? No   (!) SOCIAL CONNECTIONS CONCERN      2/29/2024   Dental   Dentist two times every year? (!) NO         2/29/2024   TB Screening   Were you born outside of [place]?  No         Today's PHQ-2 Score:       2/29/2024     7:49 AM   PHQ-2 ( 1999 [employer])   Q1: Little interest or pleasure in doing things 1   Q2: Feeling down, depressed or hopeless 1   PHQ-2 Score 2   Q1: Little interest or pleasure in doing things Several days   Q2: Feeling down, depressed or hopeless Several days   PHQ-2 Score 2           2/29/2024   Substance Use   Alcohol more than 3/day or more than  7/wk Not Applicable   Do you use any other substances recreationally? No     Social History     Tobacco Use    Smoking status: Never    Smokeless tobacco: Never   Vaping Use    Vaping Use: Never used   Substance Use Topics    Alcohol use: No    Drug use: No             2/29/2024   Breast Cancer Screening   Family history of breast, colon, or ovarian cancer? No / Unknown      Mammogram Screening - Mammogram every 1-2 years updated in Health Maintenance based on mutual decision making          2/29/2024   One time HIV Screening   Previous HIV test? No         2/29/2024   STI Screening   New sexual partner(s) since last STI/HIV test? No     History of abnormal Pap smear: NO - age 30- 65 PAP every 3 years recommended       ASCVD Risk   The ASCVD Risk score (Cony DK, et al., 2019) failed to calculate for the following reasons:    Cannot find a previous HDL lab    Cannot find a previous total cholesterol lab        2/29/2024   Contraception/Family Planning   Questions about contraception or family planning No       Reviewed and updated as needed this visit by Provider                    No past medical history on file.  Past Surgical History:   Procedure Laterality Date    EXAM UNDER ANESTHESIA RECTUM N/A 4/5/2016    Procedure: EXAM UNDER ANESTHESIA RECTUM;  Surgeon: Aric Eason MD;  Location: [facility]    HEMORRHOIDECTOMY INTERNAL  5/16/2014    Procedure: HEMORRHOIDECTOMY INTERNAL;  Surgeon: Myriam Joshua MD;  Location: [facility] OR     OB History   No obstetric history on file.     Lab work is in process  Labs reviewed in EPIC  BP Readings from Last 3 Encounters:   02/29/24 122/86   02/07/24 (!) 157/93   07/10/19 112/76    Wt Readings from Last 3 Encounters:   02/29/24 (!) 177.4 kg (391 lb)   02/07/24 (!) 176.9 kg (390 lb)   07/10/19 (!) 156.5 kg (345 lb)                  Patient Active Problem List   Diagnosis    Internal hemorrhoids with complication    Morbid obesity (H)     Past Surgical History:  "  Procedure Laterality Date    EXAM UNDER ANESTHESIA RECTUM N/A 4/5/2016    Procedure: EXAM UNDER ANESTHESIA RECTUM;  Surgeon: Aric Eason MD;  Location: [facility]    HEMORRHOIDECTOMY INTERNAL  5/16/2014    Procedure: HEMORRHOIDECTOMY INTERNAL;  Surgeon: Myriam Joshua MD;  Location: [facility] OR       Social History     Tobacco Use    Smoking status: Never    Smokeless tobacco: Never   Substance Use Topics    Alcohol use: No     No family history on file.      Current Outpatient Medications   Medication Sig Dispense Refill    albuterol (PROVENTIL HFA) 108 (90 Base) MCG/ACT inhaler Inhale 2 puffs into the lungs every 6 hours 8.5 g 11    fluticasone (FLONASE) 50 MCG/ACT nasal spray Spray 1 spray into both nostrils daily 1 g 0    IBUPROFEN PO Take 400 mg by mouth every 6 hours as needed for moderate pain      levocetirizine (XYZAL) 5 MG tablet Take 1 tablet (5 mg) by mouth every evening 90 tablet 1    OVER-THE-COUNTER Place Into the left eye 4 times daily (Patient not taking: Reported on 2/7/2024)       Allergies   Allergen Reactions    Quinine Itching         Review of Systems  CONSTITUTIONAL: NEGATIVE for fever, chills, change in weight  ENT/MOUTH: NEGATIVE for ear, mouth and throat problems  RESP:POSITIVE for cough-non productive  CV: NEGATIVE for chest pain, palpitations or peripheral edema  GI: NEGATIVE for nausea, abdominal pain, heartburn, or change in bowel habits  MUSCULOSKELETAL: NEGATIVE for significant arthralgias or myalgia  NEURO: NEGATIVE for weakness, dizziness or paresthesias  ENDOCRINE: NEGATIVE for temperature intolerance, skin/hair changes  HEME/ALLERGY/IMMUNE: NEGATIVE for bleeding problems  PSYCHIATRIC: NEGATIVE for changes in mood or affect     Objective    Exam  /86 (BP Location: Left arm, Patient Position: Chair, Cuff Size: Thigh)   Pulse 90   Temp 98.4  F (36.9  C) (Tympanic)   Resp 16   Ht 1.778 m (5' 10\")   Wt (!) 177.4 kg (391 lb)   LMP 01/24/2024   SpO2 98%   BMI 56.10 " "kg/m     Estimated body mass index is 56.1 kg/m  as calculated from the following:    Height as of this encounter: 1.778 m (5' 10\").    Weight as of this encounter: 177.4 kg (391 lb).    Physical Exam  GENERAL: alert and no distress  EYES: Eyes grossly normal to inspection, PERRL and conjunctivae and sclerae normal  HENT: ear canals and TM's normal, nose and mouth without ulcers or lesions  NECK: no adenopathy, no asymmetry, masses, or scars  RESP: lungs clear to auscultation - no rales, rhonchi or wheezes  BREAST: normal without masses, tenderness or nipple discharge and no palpable axillary masses or adenopathy  CV: regular rate and rhythm, normal S1 S2, no S3 or S4, no murmur, click or rub, no peripheral edema  ABDOMEN: soft, nontender, no hepatosplenomegaly, no masses and bowel sounds normal  MS: no gross musculoskeletal defects noted, no edema  SKIN: no suspicious lesions or rashes  PSYCH: mentation appears normal, affect normal/bright  LYMPH: no cervical, supraclavicular, axillary, or inguinal adenopathy      Signed Electronically by: Antoni Palmer MD    "

## 2024-03-05 ENCOUNTER — LAB (OUTPATIENT)
Dept: LAB | Facility: CLINIC | Age: 49
End: 2024-03-05
Payer: COMMERCIAL

## 2024-03-05 DIAGNOSIS — Z12.11 SCREEN FOR COLON CANCER: ICD-10-CM

## 2024-03-05 LAB — HEMOCCULT STL QL IA: NEGATIVE

## 2024-03-05 PROCEDURE — 82274 ASSAY TEST FOR BLOOD FECAL: CPT

## 2024-03-05 NOTE — LETTER
March 6, 2024      Cristo Toddanjumin Cathleen  [address]   [place] [place] [address]        Dear Ms.Lekenka Connolly,    We are writing to inform you of your test results.    Your FIT test was negative.  Recommend to do another FIT test next year.    Resulted Orders   Fecal colorectal cancer screen (FIT)   Result Value Ref Range    Occult Blood Screen FIT Negative Negative       If you have any questions or concerns, please call the clinic at the number listed above.       Sincerely,      Antoni Palmer MD             122/POCT Blood Glucose

## 2024-03-08 DIAGNOSIS — R05.3 CHRONIC COUGH: Primary | ICD-10-CM

## 2024-03-08 DIAGNOSIS — R06.2 WHEEZING: ICD-10-CM

## 2024-03-14 ENCOUNTER — OFFICE VISIT (OUTPATIENT)
Dept: PULMONOLOGY | Facility: CLINIC | Age: 49
End: 2024-03-14
Payer: COMMERCIAL

## 2024-03-14 DIAGNOSIS — R05.3 CHRONIC COUGH: ICD-10-CM

## 2024-03-14 DIAGNOSIS — R06.2 WHEEZING: ICD-10-CM

## 2024-03-14 PROCEDURE — 94060 EVALUATION OF WHEEZING: CPT | Performed by: INTERNAL MEDICINE

## 2024-03-14 PROCEDURE — 94729 DIFFUSING CAPACITY: CPT | Performed by: INTERNAL MEDICINE

## 2024-03-14 PROCEDURE — 94726 PLETHYSMOGRAPHY LUNG VOLUMES: CPT | Performed by: INTERNAL MEDICINE

## 2024-03-15 LAB
DLCOUNC-%PRED-PRE: 128 %
DLCOUNC-PRE: 30.86 ML/MIN/MMHG
DLCOUNC-PRED: 24.03 ML/MIN/MMHG
ERV-%PRED-PRE: 42 %
ERV-PRE: 0.61 L
ERV-PRED: 1.43 L
EXPTIME-PRE: 8.56 SEC
FEF2575-%PRED-POST: 48 %
FEF2575-%PRED-PRE: 41 %
FEF2575-POST: 1.48 L/SEC
FEF2575-PRE: 1.24 L/SEC
FEF2575-PRED: 3.03 L/SEC
FEFMAX-%PRED-PRE: 67 %
FEFMAX-PRE: 5.26 L/SEC
FEFMAX-PRED: 7.76 L/SEC
FEV1-%PRED-PRE: 63 %
FEV1-PRE: 2.01 L
FEV1FEV6-PRE: 70 %
FEV1FEV6-PRED: 83 %
FEV1FVC-PRE: 70 %
FEV1FVC-PRED: 81 %
FEV1SVC-PRE: 67 %
FEV1SVC-PRED: 78 %
FIFMAX-PRE: 6.37 L/SEC
FRCPLETH-%PRED-PRE: 84 %
FRCPLETH-PRE: 2.55 L
FRCPLETH-PRED: 3.03 L
FVC-%PRED-PRE: 72 %
FVC-PRE: 2.87 L
FVC-PRED: 3.95 L
IC-%PRED-PRE: 83 %
IC-PRE: 2.39 L
IC-PRED: 2.87 L
RVPLETH-%PRED-PRE: 97 %
RVPLETH-PRE: 1.94 L
RVPLETH-PRED: 1.99 L
TLCPLETH-%PRED-PRE: 83 %
TLCPLETH-PRE: 4.95 L
TLCPLETH-PRED: 5.94 L
VA-%PRED-PRE: 78 %
VA-PRE: 4.58 L
VC-%PRED-PRE: 74 %
VC-PRE: 3.01 L
VC-PRED: 4.05 L

## 2024-03-18 ENCOUNTER — OFFICE VISIT (OUTPATIENT)
Dept: PULMONOLOGY | Facility: CLINIC | Age: 49
End: 2024-03-18
Payer: COMMERCIAL

## 2024-03-18 VITALS
WEIGHT: 293 LBS | SYSTOLIC BLOOD PRESSURE: 139 MMHG | BODY MASS INDEX: 41.95 KG/M2 | HEART RATE: 97 BPM | DIASTOLIC BLOOD PRESSURE: 89 MMHG | OXYGEN SATURATION: 100 % | HEIGHT: 70 IN

## 2024-03-18 DIAGNOSIS — J45.50 SEVERE PERSISTENT ASTHMA WITHOUT COMPLICATION (H): Primary | ICD-10-CM

## 2024-03-18 LAB
BASOPHILS # BLD AUTO: 0 10E3/UL (ref 0–0.2)
BASOPHILS NFR BLD AUTO: 1 %
EOSINOPHIL # BLD AUTO: 0.5 10E3/UL (ref 0–0.7)
EOSINOPHIL NFR BLD AUTO: 7 %
ERYTHROCYTE [DISTWIDTH] IN BLOOD BY AUTOMATED COUNT: 14 % (ref 10–15)
HCT VFR BLD AUTO: 41.2 % (ref 35–47)
HGB BLD-MCNC: 13 G/DL (ref 11.7–15.7)
IMM GRANULOCYTES # BLD: 0 10E3/UL
IMM GRANULOCYTES NFR BLD: 0 %
LYMPHOCYTES # BLD AUTO: 2 10E3/UL (ref 0.8–5.3)
LYMPHOCYTES NFR BLD AUTO: 29 %
MCH RBC QN AUTO: 26 PG (ref 26.5–33)
MCHC RBC AUTO-ENTMCNC: 31.6 G/DL (ref 31.5–36.5)
MCV RBC AUTO: 82 FL (ref 78–100)
MONOCYTES # BLD AUTO: 0.6 10E3/UL (ref 0–1.3)
MONOCYTES NFR BLD AUTO: 8 %
NEUTROPHILS # BLD AUTO: 3.8 10E3/UL (ref 1.6–8.3)
NEUTROPHILS NFR BLD AUTO: 56 %
PLATELET # BLD AUTO: 246 10E3/UL (ref 150–450)
RBC # BLD AUTO: 5 10E6/UL (ref 3.8–5.2)
WBC # BLD AUTO: 6.9 10E3/UL (ref 4–11)

## 2024-03-18 PROCEDURE — 99205 OFFICE O/P NEW HI 60 MIN: CPT | Performed by: INTERNAL MEDICINE

## 2024-03-18 PROCEDURE — 85025 COMPLETE CBC W/AUTO DIFF WBC: CPT | Performed by: INTERNAL MEDICINE

## 2024-03-18 PROCEDURE — 83876 ASSAY MYELOPEROXIDASE: CPT | Performed by: INTERNAL MEDICINE

## 2024-03-18 PROCEDURE — 83880 ASSAY OF NATRIURETIC PEPTIDE: CPT | Performed by: INTERNAL MEDICINE

## 2024-03-18 PROCEDURE — 86003 ALLG SPEC IGE CRUDE XTRC EA: CPT | Performed by: INTERNAL MEDICINE

## 2024-03-18 PROCEDURE — 82785 ASSAY OF IGE: CPT | Mod: 90 | Performed by: INTERNAL MEDICINE

## 2024-03-18 PROCEDURE — 83516 IMMUNOASSAY NONANTIBODY: CPT | Performed by: INTERNAL MEDICINE

## 2024-03-18 PROCEDURE — 99000 SPECIMEN HANDLING OFFICE-LAB: CPT | Performed by: INTERNAL MEDICINE

## 2024-03-18 PROCEDURE — 86003 ALLG SPEC IGE CRUDE XTRC EA: CPT | Mod: 90 | Performed by: INTERNAL MEDICINE

## 2024-03-18 PROCEDURE — 36415 COLL VENOUS BLD VENIPUNCTURE: CPT | Performed by: INTERNAL MEDICINE

## 2024-03-18 PROCEDURE — 86606 ASPERGILLUS ANTIBODY: CPT | Mod: 90 | Performed by: INTERNAL MEDICINE

## 2024-03-18 RX ORDER — BUDESONIDE AND FORMOTEROL FUMARATE DIHYDRATE 160; 4.5 UG/1; UG/1
2 AEROSOL RESPIRATORY (INHALATION) 2 TIMES DAILY
Qty: 1 G | Refills: 4 | Status: SHIPPED | OUTPATIENT
Start: 2024-03-18 | End: 2024-06-07

## 2024-03-18 RX ORDER — ALBUTEROL SULFATE 0.83 MG/ML
2.5 SOLUTION RESPIRATORY (INHALATION) EVERY 6 HOURS PRN
Qty: 90 ML | Refills: 5 | Status: SHIPPED | OUTPATIENT
Start: 2024-03-18 | End: 2024-05-02

## 2024-03-18 RX ORDER — PREDNISONE 10 MG/1
TABLET ORAL
Qty: 40 TABLET | Refills: 0 | Status: SHIPPED | OUTPATIENT
Start: 2024-03-18 | End: 2024-04-03

## 2024-03-18 NOTE — PROGRESS NOTES
[facility]     Pulmonary Clinic Note       PCP: Antoni Palmer    Reason for visit: New patient.  Cough and wheezing    Pulmonary HPI:   Cristo Connolly is a 49 year old  female w/ h/o severe obesity, allergies, and internal hemorrhoids and, who presents for evaluation of cough and wheezing.  The patient had similar but less severe episode and October when she had cough, but she was not wheezing significantly, at that time she was treated with amoxicillin and cough syrup and she felt better.  Then after feeling back to normal she started having worsening symptoms again and early February.  Her cough has been her cough started to be nocturnal first, and then progressed to be throughout the day, the cough is dry but the patient feels congested in her chest and hard for her to expectorate.  The patient also has significant wheezing and difficulty breathing.  She followed with primary care provider, and she was seen at the urgent care.  She received treatment for that with antihistamine and antibiotics with doxycycline with no significant improvement.  In the past she had intermittent chronic cough that she attributed to allergies to thinks related to certain fabrics in her bedroom including clothing articles, furniture, and pillows, and typically this will improve after removing the culprit.  This time around she removed all the potential culprits from her home, she washed the carpet, and her symptoms are continuing to be progressively worsening despite all diet.  The patient started to have some pain in her chest from coughing.  She denied lower extremity edema.  She denied orthopnea.  No PND.  She denied hemoptysis.  She had fever initially, but this improved without issues.  She denied rash on her skin, no nodular changes on her skin.  She denied runny nose or sinus congestion.  She denied heartburn.        Patient is a never smoker.  No excessive EtOH use.  She denied vaping or  "recreational drug use.   Patient works as an , she immigrated from [place] 10 years ago, she received her decreased from [place] [facility] and [place].     Patient's outside records were reviewed via Care Everywhere &/or available paper records (sent for scanning).     Review of systems: a complete 12-point ROS conducted, & found to be negative w/ exceptions as noted in the HPI.    Past medical history:  History reviewed. No pertinent past medical history.    Past Surgical History:   Procedure Laterality Date    EXAM UNDER ANESTHESIA RECTUM N/A 4/5/2016    Procedure: EXAM UNDER ANESTHESIA RECTUM;  Surgeon: Aric Eason MD;  Location: [facility]    HEMORRHOIDECTOMY INTERNAL  5/16/2014    Procedure: HEMORRHOIDECTOMY INTERNAL;  Surgeon: Myriam Joshua MD;  Location: [facility]       Social history:  Social History     Tobacco Use    Smoking status: Never    Smokeless tobacco: Never   Vaping Use    Vaping Use: Never used   Substance Use Topics    Alcohol use: No    Drug use: No       Family history:  Family history for asthma and her sisters.    Medications:  Current Outpatient Medications   Medication    albuterol (PROVENTIL HFA) 108 (90 Base) MCG/ACT inhaler    albuterol (PROVENTIL) (2.5 MG/3ML) 0.083% neb solution    budesonide-formoterol (SYMBICORT) 160-4.5 MCG/ACT Inhaler    fluticasone (FLONASE) 50 MCG/ACT nasal spray    IBUPROFEN PO    levocetirizine (XYZAL) 5 MG tablet    predniSONE (DELTASONE) 10 MG tablet    OVER-THE-COUNTER     No current facility-administered medications for this visit.       Allergies:  Allergies   Allergen Reactions    Quinine Itching       Physical examination:  /89 (BP Location: Right arm, Patient Position: Sitting, Cuff Size: Adult Regular)   Pulse 97   Ht 1.778 m (5' 10\")   Wt (!) 177.4 kg (391 lb)   LMP 01/24/2024   SpO2 100%   BMI 56.10 kg/m      General: NAD  Eyes: Anicteric sclera, PERRL, EOMI  Mouth: MMM w/o lesions; no tonsillar enlargement; no oropharyngeal " exudate, or erythema  CV: RRR, no m/c/r;   Lungs: Coarse rhonchi bilaterally.  Scattered wheezing especially at the apices.  Abd: Round.  Nondistended.  Ext: WWP, no pitting BLE edema.  No clubbing  Skin: No rashes, cyanosis, or jaundice  Neuro: Intact mentation w/ normal speech. Normal strength & muscle tone w/ normal gait & stance  Psych: Euthymic, normal affect, good eye contact    Labs: No previous labs for review.    Imaging: reviewed in EPIC & personally interpreted. Below are the interpretations from the formal Radiology review.  CXR 2/7/2024: No cardiomegaly.  No significant diffuse parenchymal changes or airspace disease.  I noticed some bronchial cuffing with bronchial wall thickening.    PFT:  3/14/2024: Moderate obstruction, mild restriction, no significant response to bronchodilators.  Increased DLCO which can be seen in asthma, DAH, and obesity.      Impression & recommendations:    Cristo was seen today for consult, wheezing and cough.    Diagnoses and all orders for this visit:    Severe persistent asthma without complication (H28)  -     budesonide-formoterol (SYMBICORT) 160-4.5 MCG/ACT Inhaler; Inhale 2 puffs into the lungs 2 times daily  -     predniSONE (DELTASONE) 10 MG tablet; Take 4 tablets (40 mg) by mouth daily for 4 days, THEN 3 tablets (30 mg) daily for 4 days, THEN 2 tablets (20 mg) daily for 4 days, THEN 1 tablet (10 mg) daily for 4 days.  -     Adult Pulmonology Clinic Follow-Up Order (Blank); Future  -     albuterol (PROVENTIL) (2.5 MG/3ML) 0.083% neb solution; Take 1 vial (2.5 mg) by nebulization every 6 hours as needed for shortness of breath, wheezing or cough  -     Nebulizer and Supplies Order for DME - ONLY FOR DME  -     CBC with platelets differential; Future  -     IgE; Future  -     [place] Resp Allergen Panel; Future  -     N terminal pro BNP outpatient; Future  -     Bronchopulm Aspergillosis Allergic Panel; Future  -     Myeloperoxidase and Proteinase 3 Panel; Future  -      CBC with platelets differential  -     IgE  -     [place] Resp Allergen Panel  -     N terminal pro BNP outpatient  -     Bronchopulm Aspergillosis Allergic Panel  -     Myeloperoxidase and Proteinase 3 Panel        49 year old female patient with past medical history significant for allergies, severe obesity, and internal hemorrhoids.  The patient was self-referred to the pulmonary clinic due to ongoing cough with wheezing and shortness of breath.  The patient clinical history is concerning for reactive airway disease and asthma especially with her history of allergies, wheezing, and chronic cough with mucus plugging.  I reviewed the PFT with the patient, she does have moderate obstruction, no response to bronchodilator which is not typical for asthma, but given the lack of history of smoking COPD is unlikely, longstanding asthma can cause airway remodeling and eventually reversible airway obstruction.  The increased DLCO also can be seen in asthma, but can be seen also in obesity.  The chest x-ray does not show evidence for diffuse parenchymal lung disease, and with the increased DLCO, interstitial lung disease is unlikely cause for her mild restriction, which is likely related to her severe obesity.  At this point I will recommend to initiate ICS/LABA, I will order lab evaluation for phenotyping of her asthma, I will also start her on a course of prednisone taper.  If she does not have significant improvement with the 2 tablets twice a day for 3 days, then 1 tablet twice a day for 3 days, then 1/2 tablet twice a day for 3 days, then 1/2 tablet daily for three days.  Then I would consider alternative etiologies such as cardiac pathologies or others (I think less likely at this point)    Persistent severe asthma with acute exacerbation    -Order prednisone taper over 2 weeks  -Start Symbicort twice daily  -Order albuterol nebulization as needed especially with her mucous plugging for better airway  clearance  -Order CBC with differential, total IgE, ABPA, [place] allergy panel, [facility] N-terminal proBNP.    Severe obesity    -Counseled the patient about weight reduction.  I counseled the patient that severe obesity has been implicated with increased type II inflammation and asthma poor control.      Follow-up in 2 months    Chart documentation was completed, in part, with [employer] voice-recognition software. Even though reviewed, some grammatical, spelling, and word errors may remain.    I spent 70 minutes reviewing chart, reviewing test results, talking with and examining patient, formulating plan, and documentation on the day of the encounter.          Emily Sahu MD   Pulmonary and Critical Care

## 2024-03-18 NOTE — PATIENT INSTRUCTIONS
Start prednisone 40 mg once daily for 4 days, followed by prednisone 30 mg once daily for 4 days, followed by prednisone 20 mg once daily for 4 days, followed by prednisone 10 mg once daily for 4 days  Start Symbicort 2 puffs twice daily   I prescribed albuterol nebulizer to be used as needed   I ordered lab today   Follow up in 2 months

## 2024-03-19 LAB
MYELOPEROXIDASE AB SER IA-ACNC: 0.3 U/ML
MYELOPEROXIDASE AB SER IA-ACNC: NEGATIVE
NT-PROBNP SERPL-MCNC: 63 PG/ML (ref 0–450)
PROTEINASE3 AB SER IA-ACNC: <1 U/ML
PROTEINASE3 AB SER IA-ACNC: NEGATIVE

## 2024-03-21 ENCOUNTER — ANCILLARY PROCEDURE (OUTPATIENT)
Dept: MAMMOGRAPHY | Facility: CLINIC | Age: 49
End: 2024-03-21
Attending: FAMILY MEDICINE
Payer: COMMERCIAL

## 2024-03-21 DIAGNOSIS — R92.8 ABNORMAL MAMMOGRAM: Primary | ICD-10-CM

## 2024-03-21 DIAGNOSIS — Z12.31 VISIT FOR SCREENING MAMMOGRAM: ICD-10-CM

## 2024-03-21 LAB
A ALTERNATA IGE QN: <0.1 KU(A)/L
A FUMIGATUS IGE QN: <0.1 KU(A)/L
BERMUDA GRASS IGE QN: <0.1 KU(A)/L
C HERBARUM IGE QN: <0.1 KU(A)/L
CAT DANDER IGG QN: <0.1 KU(A)/L
CEDAR IGE QN: <0.1 KU(A)/L
COMMON RAGWEED IGE QN: <0.1 KU(A)/L
COTTONWOOD IGE QN: <0.1 KU(A)/L
D FARINAE IGE QN: <0.1 KU(A)/L
D PTERONYSS IGE QN: <0.1 KU(A)/L
DEPRECATED MISC ALLERGEN IGE RAST QL: NORMAL
DOG DANDER+EPITH IGE QN: <0.1 KU(A)/L
IGE SERPL-ACNC: 10 KU/L (ref 0–114)
MAPLE IGE QN: <0.1 KU(A)/L
MARSH ELDER IGE QN: <0.1 KU(A)/L
MOUSE URINE PROT IGE QN: <0.1 KU(A)/L
NETTLE IGE QN: <0.1 KU(A)/L
P NOTATUM IGE QN: <0.1 KU(A)/L
ROACH IGE QN: <0.1 KU(A)/L
SALTWORT IGE QN: <0.1 KU(A)/L
SILVER BIRCH IGE QN: <0.1 KU(A)/L
TIMOTHY IGE QN: <0.1 KU(A)/L
WHITE ASH IGE QN: <0.1 KU(A)/L
WHITE ELM IGE QN: <0.1 KU(A)/L
WHITE MULBERRY IGE QN: <0.1 KU(A)/L
WHITE OAK IGE QN: <0.1 KU(A)/L

## 2024-03-21 PROCEDURE — 77067 SCR MAMMO BI INCL CAD: CPT | Mod: TC | Performed by: RADIOLOGY

## 2024-03-21 PROCEDURE — 77063 BREAST TOMOSYNTHESIS BI: CPT | Mod: TC | Performed by: RADIOLOGY

## 2024-03-24 LAB
A FUMIGATUS IGE QN: <0.1 KU/L
A FUMIGATUS1 AB SER QL ID: NORMAL
A FUMIGATUS6 AB SER QL ID: NORMAL
IGE SERPL-ACNC: 11 KU/L

## 2024-03-25 ENCOUNTER — TELEPHONE (OUTPATIENT)
Dept: FAMILY MEDICINE | Facility: CLINIC | Age: 49
End: 2024-03-25
Payer: COMMERCIAL

## 2024-03-25 ENCOUNTER — HOSPITAL ENCOUNTER (OUTPATIENT)
Dept: ULTRASOUND IMAGING | Facility: CLINIC | Age: 49
Discharge: HOME | End: 2024-03-25
Attending: FAMILY MEDICINE | Admitting: FAMILY MEDICINE
Payer: COMMERCIAL

## 2024-03-25 DIAGNOSIS — R92.8 ABNORMAL MAMMOGRAM: ICD-10-CM

## 2024-03-25 PROCEDURE — 76642 ULTRASOUND BREAST LIMITED: CPT | Mod: LT

## 2024-03-25 NOTE — TELEPHONE ENCOUNTER
Patient calls for results of L breast ultrasound.  Appears benign, but there was a cyst noted.   Patient advised that PCP is out this week, so will forward to covering providers for review and any further recommendations please.   It doesn't sound like patient is symptomatic.    Sarahy Valencia RN  [facility]

## 2024-03-25 NOTE — TELEPHONE ENCOUNTER
Covering for primary/ordering provider:  A benign cyst was noted on the ultrasound. This requires no specific follow up, and can return to regular routine mammograms.  Gisell Gaspar, CNP

## 2024-03-26 NOTE — TELEPHONE ENCOUNTER
Cristo returned call. Relayed message below which she had already been told about.   Stephanie LUNA RN

## 2024-03-31 ENCOUNTER — E-VISIT (OUTPATIENT)
Dept: URGENT CARE | Facility: CLINIC | Age: 49
End: 2024-03-31
Payer: COMMERCIAL

## 2024-03-31 DIAGNOSIS — B30.9 VIRAL CONJUNCTIVITIS: Primary | ICD-10-CM

## 2024-03-31 PROCEDURE — 99207 PR NON-BILLABLE SERV PER CHARTING: CPT | Performed by: PHYSICIAN ASSISTANT

## 2024-03-31 NOTE — PATIENT INSTRUCTIONS
Thank you for choosing us for your care. Based on your symptoms and length of illness, I do not think that you need a prescription at this time.  Please follow the care advise I ve provided and use the over the counter medications to help relieve your symptoms. View your full visit summary for details by clicking on the link below.     If you re not feeling better within 2-3 days, please respond to this message and we can consider if a prescription is needed.  You can schedule an appointment right here in [facility], or call [phone]  If the visit is for the same symptoms as your eVisit, we ll refund the cost of your eVisit if seen within seven days.

## 2024-04-10 ENCOUNTER — TELEPHONE (OUTPATIENT)
Dept: FAMILY MEDICINE | Facility: CLINIC | Age: 49
End: 2024-04-10
Payer: COMMERCIAL

## 2024-04-10 NOTE — TELEPHONE ENCOUNTER
Patient Quality Outreach    Patient is due for the following:   Cervical Cancer Screening - PAP Needed    Next Steps:   Patient declined follow up at this time.  Declined at her physical on 2-.  Will check with patient at her next yearly physical to see if she would like to have this completed.    Type of outreach:    Chart review performed, no outreach needed.    Next Steps:  Reach out within 90 days via  Will recheck with patient at the time of her next CPE .    Max number of attempts reached:  Will recheck in one year .   Questions for provider review:    None           Brianna Hebert, IVAN  Chart routed to none, encounter closed.

## 2024-06-07 ENCOUNTER — OFFICE VISIT (OUTPATIENT)
Dept: PULMONOLOGY | Facility: CLINIC | Age: 49
End: 2024-06-07
Attending: INTERNAL MEDICINE
Payer: COMMERCIAL

## 2024-06-07 VITALS
SYSTOLIC BLOOD PRESSURE: 122 MMHG | HEIGHT: 70 IN | BODY MASS INDEX: 56.1 KG/M2 | OXYGEN SATURATION: 100 % | DIASTOLIC BLOOD PRESSURE: 75 MMHG | HEART RATE: 87 BPM

## 2024-06-07 DIAGNOSIS — R06.83 SNORING: ICD-10-CM

## 2024-06-07 DIAGNOSIS — R60.0 LEG EDEMA: ICD-10-CM

## 2024-06-07 DIAGNOSIS — J45.50 SEVERE PERSISTENT ASTHMA WITHOUT COMPLICATION (H): Primary | ICD-10-CM

## 2024-06-07 PROCEDURE — 99214 OFFICE O/P EST MOD 30 MIN: CPT | Performed by: INTERNAL MEDICINE

## 2024-06-07 RX ORDER — BUDESONIDE AND FORMOTEROL FUMARATE DIHYDRATE 80; 4.5 UG/1; UG/1
2 AEROSOL RESPIRATORY (INHALATION)
Qty: 1 G | Refills: 4 | Status: SHIPPED | OUTPATIENT
Start: 2024-06-07 | End: 2024-07-07

## 2024-06-07 ASSESSMENT — ASTHMA QUESTIONNAIRES
ACT_TOTALSCORE: 23
QUESTION_4 LAST FOUR WEEKS HOW OFTEN HAVE YOU USED YOUR RESCUE INHALER OR NEBULIZER MEDICATION (SUCH AS ALBUTEROL): ONCE A WEEK OR LESS
ACT_TOTALSCORE: 23
QUESTION_5 LAST FOUR WEEKS HOW WOULD YOU RATE YOUR ASTHMA CONTROL: COMPLETELY CONTROLLED
EMERGENCY_ROOM_LAST_YEAR_TOTAL: ONE
QUESTION_2 LAST FOUR WEEKS HOW OFTEN HAVE YOU HAD SHORTNESS OF BREATH: ONCE OR TWICE A WEEK
QUESTION_1 LAST FOUR WEEKS HOW MUCH OF THE TIME DID YOUR ASTHMA KEEP YOU FROM GETTING AS MUCH DONE AT WORK, SCHOOL OR AT HOME: NONE OF THE TIME
ACUTE_EXACERBATION_TODAY: NO
QUESTION_3 LAST FOUR WEEKS HOW OFTEN DID YOUR ASTHMA SYMPTOMS (WHEEZING, COUGHING, SHORTNESS OF BREATH, CHEST TIGHTNESS OR PAIN) WAKE YOU UP AT NIGHT OR EARLIER THAN USUAL IN THE MORNING: NOT AT ALL

## 2024-06-07 NOTE — PROGRESS NOTES
[facility]     Pulmonary Clinic Note       PCP: Antoni Palmer    Reason for visit: Follow up for asthma, cough and wheezing.    Pulmonary HPI:   Cristo Connolly is a 49 year old  female w/ h/o severe obesity, allergies, and internal hemorrhoids and, who presents for a follow-up visit.  The patient was referred to the pulmonary clinic and was seen for the first time in the clinic on 3/18/2024.  At that time she was referred due to ongoing cough, and wheezing that was not responsive to treatment with antibiotics.  She has a history significant for allergies in the past.  In the her clinical history was concerning for asthma and reactive airway disease due to the allergies, wheezing, chronic cough and mucous plug.  She did have moderate obstruction on PFTs but no significant response to bronchodilator.    The patient was last seen in the pulmonary clinic on 3/18/2024, at that time I started patient on prednisone taper for 2 weeks, I also started her on Symbicort twice daily.    Patient reports that she has had significant improvement in her symptoms after she started the prednisone taper and the inhaler.  She finished the prednisone taper after 2 weeks.  She continues to use the Symbicort inhaler and a month ago when she stopped due to the cost, but she had insurance coverage now.  She used to take the Symbicort consistently, although she was forgetting occasionally 1 dose a day.  Overall her symptoms have been well-controlled, she needed to use the rescue nebulizer once a week only on average, no recent visit to the emergency department or urgent care due to respiratory symptoms.  Her cough has improved significantly.  Patient complains of lower extremity edema which is new.  She also complains of being tired and fatigue despite having long hours of sleep up to 16 hours on some days, she takes frequent naps during the day.  She was told she snores in the past, but she was never  witnessed to have apnea.        Patient is a never smoker.  No excessive EtOH use.  She denied vaping or recreational drug use.   Patient works as an , she immigrated from [place] 10 years ago, she received her decreased from [facility] and [facility].     Patient's outside records were reviewed via Care Everywhere &/or available paper records (sent for scanning).     Review of systems: a complete 12-point ROS conducted, & found to be negative w/ exceptions as noted in the HPI.    Past medical history:  History reviewed. No pertinent past medical history.    Past Surgical History:   Procedure Laterality Date    EXAM UNDER ANESTHESIA RECTUM N/A 4/5/2016    Procedure: EXAM UNDER ANESTHESIA RECTUM;  Surgeon: Aric Eason MD;  Location: [facility] OR    HEMORRHOIDECTOMY INTERNAL  5/16/2014    Procedure: HEMORRHOIDECTOMY INTERNAL;  Surgeon: Myriam Joshua MD;  Location: [facility] OR       Social history:  Social History     Tobacco Use    Smoking status: Never    Smokeless tobacco: Never   Vaping Use    Vaping status: Never Used   Substance Use Topics    Alcohol use: No    Drug use: No       Family history:  Family history for asthma and her sisters.    Medications:  Current Outpatient Medications   Medication Sig Dispense Refill    albuterol (PROVENTIL HFA) 108 (90 Base) MCG/ACT inhaler Inhale 2 puffs into the lungs every 6 hours 8.5 g 11    budesonide-formoterol (SYMBICORT) 80-4.5 MCG/ACT Inhaler Inhale 2 puffs into the lungs two times daily for 30 days 1 g 4    fluticasone (FLONASE) 50 MCG/ACT nasal spray Spray 1 spray into both nostrils daily 1 g 0    IBUPROFEN PO Take 400 mg by mouth every 6 hours as needed for moderate pain      albuterol (PROVENTIL) (2.5 MG/3ML) 0.083% neb solution Take 1 vial (2.5 mg) by nebulization every 6 hours as needed for shortness of breath, wheezing or cough 90 mL 5    levocetirizine (XYZAL) 5 MG tablet Take 1 tablet (5 mg) by mouth every evening (Patient not taking: Reported on  "6/7/2024) 90 tablet 1    OVER-THE-COUNTER Place Into the left eye 4 times daily (Patient not taking: Reported on 2/7/2024)       No current facility-administered medications for this visit.       Allergies:  Allergies   Allergen Reactions    Quinine Itching       Physical examination:  /75 (BP Location: Right arm, Patient Position: Sitting, Cuff Size: Adult Large)   Pulse 87   Ht 1.778 m (5' 10\")   SpO2 100%   BMI 56.10 kg/m      General: NAD  Lungs: Clear to auscultation bilaterally.  No wheezing.  Abd: Round.    Ext: WWP, +2 pitting edema bilaterally.  No clubbing.  Skin: No rashes, cyanosis, or jaundice  Neuro: Intact mentation w/ normal speech. Normal strength & muscle tone w/ normal gait & stance  Psych: Euthymic, normal affect, good eye contact    Labs: Reviewed labs personally.    CBC RESULTS:   Recent Labs   Lab Test 03/18/24  1444   WBC 6.9   RBC 5.00   HGB 13.0   HCT 41.2   MCV 82   MCH 26.0*   MCHC 31.6   RDW 14.0      Absolute eosinophils 500    Recent Labs   Lab Test 02/29/24  0853      POTASSIUM 4.0   CHLORIDE 107   CO2 24   ANIONGAP 9   *   BUN 10.4   CR 0.76   DANYELLE 9.0     N-terminal proBNP 63    Negative ABPA panel    Negative ANCA    Negative [place] allergy panel    Imaging: reviewed in [facility] & personally interpreted. Below are the interpretations from the formal Radiology review.  CXR 2/7/2024: No cardiomegaly.  No significant diffuse parenchymal changes or airspace disease.  I noticed some bronchial cuffing with bronchial wall thickening.    PFT:  3/14/2024: Moderate obstruction, mild restriction, no significant response to bronchodilators.  Increased DLCO which can be seen in asthma, DAH, and obesity.      Impression & recommendations:    Cristo was seen today for follow up.    Diagnoses and all orders for this visit:    Severe persistent asthma without complication (H28)  -     Adult Pulmonology Clinic Follow-Up Order (Blank)  -     budesonide-formoterol " (SYMBICORT) 80-4.5 MCG/ACT Inhaler; Inhale 2 puffs into the lungs two times daily for 30 days  -     Adult Pulmonology Clinic Follow-Up Order (6 Month); Future  -     General PFT Lab (Please always keep checked); Future  -     Pulmonary Function Test; Future    Leg edema  -     Echocardiogram Complete with Bubble Study; Future    Snoring  -     Adult Sleep Eval & Management  Referral; Future          49 year old female patient with past medical history significant for allergies, severe obesity, and internal hemorrhoids.  The patient was self-referred to the pulmonary clinic due to ongoing cough with wheezing and shortness of breath.  The patient clinical history is concerning for reactive airway disease and asthma especially with her history of allergies, wheezing, and chronic cough with mucus plugging.  I reviewed the PFT with the patient, she does have moderate obstruction, no response to bronchodilator which is not typical for asthma, but given the lack of history of smoking COPD is unlikely, longstanding asthma can cause airway remodeling and eventually reversible airway obstruction.  The increased DLCO also can be seen in asthma, but can be seen also in obesity.  The chest x-ray does not show evidence for diffuse parenchymal lung disease, and with the increased DLCO, interstitial lung disease is unlikely cause for her mild restriction, which is likely related to her severe obesity.      Patient improved with inhaled corticosteroids.  I will continue that but I will start to the lower dose ICS/LABA with Symbicort.  Continue as needed CRISTOFER.    Noticeably she has significant increased in the lower extremity edema, I will order echocardiogram.    She also has high pretest probability for obstructive sleep apnea, I think she would benefit from sleep study.  I will refer her to sleep clinic.    Persistent severe asthma with acute exacerbation            6/7/2024     7:30 AM 6/7/2024     7:32 AM   ACT Total  Scores   ACT TOTAL SCORE (Goal Greater than or Equal to 20) 23    In the past 12 months, how many times did you visit the emergency room for your asthma without being admitted to the hospital? 1 1   In the past 12 months, how many times were you hospitalized overnight because of your asthma?  0         -Resume Symbicort with low-dose ICS/LABA  -Continue as needed CRISTOFER  -Follow-up in 6 months to decide if you would step down on her therapy further, if she continues to be well-controlled I will change her to as needed ICS/LABA with formoterol formulation.  -Repeat PFT next time she visits with me.    Lower extremity edema    -Order echocardiogram    High pretest probability for obstructive sleep apnea    -Refer to sleep medicine to evaluate for sleep apnea with sleep study.        Follow-up in 6 months    Chart documentation was completed, in part, with [email or web address] voice-recognition software. Even though reviewed, some grammatical, spelling, and word errors may remain.    I spent 35 minutes reviewing chart, reviewing test results, talking with and examining patient, formulating plan, and documentation on the day of the encounter.          Emily Sahu MD   Pulmonary and Critical Care

## 2024-06-07 NOTE — PATIENT INSTRUCTIONS
I ordered symbicort inhaler to be used 2 puffs twice daily   I ordered Echo of your heart   I referred you to sleep clinic   PFT in six months   Follow up in 6 months

## 2024-06-11 ENCOUNTER — HOSPITAL ENCOUNTER (OUTPATIENT)
Dept: CARDIOLOGY | Facility: CLINIC | Age: 49
Discharge: HOME | End: 2024-06-11
Attending: INTERNAL MEDICINE | Admitting: INTERNAL MEDICINE
Payer: COMMERCIAL

## 2024-06-11 DIAGNOSIS — R60.0 LEG EDEMA: ICD-10-CM

## 2024-06-11 LAB — LVEF ECHO: NORMAL

## 2024-06-11 PROCEDURE — 255N000002 HC RX 255 OP 636: Performed by: INTERNAL MEDICINE

## 2024-06-11 PROCEDURE — 999N000208 ECHOCARDIOGRAM COMPLETE

## 2024-06-11 PROCEDURE — 93306 TTE W/DOPPLER COMPLETE: CPT | Mod: 26 | Performed by: INTERNAL MEDICINE

## 2024-06-11 RX ADMIN — HUMAN ALBUMIN MICROSPHERES AND PERFLUTREN 9 ML: 10; .22 INJECTION, SOLUTION INTRAVENOUS at 14:48

## 2024-10-03 ENCOUNTER — TELEPHONE (OUTPATIENT)
Dept: FAMILY MEDICINE | Facility: CLINIC | Age: 49
End: 2024-10-03
Payer: COMMERCIAL

## 2024-10-03 NOTE — TELEPHONE ENCOUNTER
Patient Quality Outreach    Patient is due for the following:   Cervical Cancer Screening - PAP Needed    Next Steps:   Patient declined follow up at this time.  Declined pap at her 2-29-24 physical.    Type of outreach:    Chart review performed, no outreach needed.    Next Steps:  Reach out within 90 days via  review chart again for HM .    Max number of attempts reached: No. Will try again in 90 days if patient still on fail list.    Questions for provider review:    None           Brianna Hebert, [facility]  Chart routed to none, encounter closed.

## 2024-12-10 ENCOUNTER — MYC REFILL (OUTPATIENT)
Dept: PULMONOLOGY | Facility: CLINIC | Age: 49
End: 2024-12-10
Payer: COMMERCIAL

## 2024-12-10 DIAGNOSIS — J45.50 SEVERE PERSISTENT ASTHMA WITHOUT COMPLICATION (H): ICD-10-CM

## 2024-12-10 RX ORDER — BUDESONIDE AND FORMOTEROL FUMARATE DIHYDRATE 160; 4.5 UG/1; UG/1
2 AEROSOL RESPIRATORY (INHALATION) 2 TIMES DAILY
Qty: 1 G | Refills: 4 | Status: SHIPPED | OUTPATIENT
Start: 2024-12-10

## 2024-12-10 NOTE — TELEPHONE ENCOUNTER
After chart review and based on prior discussion with MD it was noted that this is appropriate for a refill.    CHANDU NovoaN RN Specialty Triage 12/10/2024 4:24 PM

## 2025-01-08 ENCOUNTER — PATIENT OUTREACH (OUTPATIENT)
Dept: CARE COORDINATION | Facility: CLINIC | Age: 50
End: 2025-01-08
Payer: COMMERCIAL

## 2025-01-29 ENCOUNTER — HOSPITAL ENCOUNTER (OUTPATIENT)
Dept: CARDIAC REHAB | Facility: CLINIC | Age: 50
Discharge: HOME | End: 2025-01-29
Payer: COMMERCIAL

## 2025-01-29 DIAGNOSIS — J45.50 SEVERE PERSISTENT ASTHMA WITHOUT COMPLICATION (H): ICD-10-CM

## 2025-01-29 LAB
DLCOUNC-%PRED-PRE: 119 %
DLCOUNC-PRE: 28.65 ML/MIN/MMHG
DLCOUNC-PRED: 23.94 ML/MIN/MMHG
ERV-%PRED-PRE: 22 %
ERV-PRE: 0.32 L
ERV-PRED: 1.43 L
EXPTIME-PRE: 8.37 SEC
FEF2575-%PRED-PRE: 87 %
FEF2575-PRE: 2.63 L/SEC
FEF2575-PRED: 2.99 L/SEC
FEFMAX-%PRED-PRE: 85 %
FEFMAX-PRE: 6.6 L/SEC
FEFMAX-PRED: 7.73 L/SEC
FEV1-%PRED-PRE: 79 %
FEV1-PRE: 2.51 L
FEV1FEV6-PRE: 83 %
FEV1FEV6-PRED: 82 %
FEV1FVC-PRE: 83 %
FEV1FVC-PRED: 81 %
FEV1SVC-PRE: 81 %
FEV1SVC-PRED: 78 %
FIFMAX-PRE: 5.38 L/SEC
FRCPLETH-%PRED-PRE: 70 %
FRCPLETH-PRE: 2.37 L
FRCPLETH-PRED: 3.37 L
FVC-%PRED-PRE: 76 %
FVC-PRE: 3.01 L
FVC-PRED: 3.92 L
IC-%PRED-PRE: 96 %
IC-PRE: 2.75 L
IC-PRED: 2.86 L
RVPLETH-%PRED-PRE: 100 %
RVPLETH-PRE: 2.02 L
RVPLETH-PRED: 2 L
TLCPLETH-%PRED-PRE: 86 %
TLCPLETH-PRE: 5.12 L
TLCPLETH-PRED: 5.94 L
VA-%PRED-PRE: 79 %
VA-PRE: 4.67 L
VC-%PRED-PRE: 76 %
VC-PRE: 3.1 L
VC-PRED: 4.04 L

## 2025-01-29 PROCEDURE — 94375 RESPIRATORY FLOW VOLUME LOOP: CPT

## 2025-01-29 PROCEDURE — 94729 DIFFUSING CAPACITY: CPT

## 2025-01-29 PROCEDURE — 94726 PLETHYSMOGRAPHY LUNG VOLUMES: CPT

## 2025-02-05 ENCOUNTER — PATIENT OUTREACH (OUTPATIENT)
Dept: CARE COORDINATION | Facility: CLINIC | Age: 50
End: 2025-02-05
Payer: COMMERCIAL

## 2025-02-06 ENCOUNTER — OFFICE VISIT (OUTPATIENT)
Dept: PULMONOLOGY | Facility: CLINIC | Age: 50
End: 2025-02-06
Attending: INTERNAL MEDICINE
Payer: COMMERCIAL

## 2025-02-06 VITALS
RESPIRATION RATE: 16 BRPM | HEIGHT: 70 IN | HEART RATE: 72 BPM | DIASTOLIC BLOOD PRESSURE: 72 MMHG | BODY MASS INDEX: 41.95 KG/M2 | SYSTOLIC BLOOD PRESSURE: 140 MMHG | WEIGHT: 293 LBS | OXYGEN SATURATION: 97 %

## 2025-02-06 DIAGNOSIS — J45.50 SEVERE PERSISTENT ASTHMA WITHOUT COMPLICATION (H): Primary | ICD-10-CM

## 2025-02-06 DIAGNOSIS — Z12.11 COLON CANCER SCREENING: ICD-10-CM

## 2025-02-06 DIAGNOSIS — R06.2 WHEEZING: ICD-10-CM

## 2025-02-06 DIAGNOSIS — R06.83 SNORING: ICD-10-CM

## 2025-02-06 RX ORDER — ALBUTEROL SULFATE 90 UG/1
2 INHALANT RESPIRATORY (INHALATION) EVERY 6 HOURS PRN
Qty: 18 G | Refills: 11 | Status: SHIPPED | OUTPATIENT
Start: 2025-02-06

## 2025-02-06 RX ORDER — ALBUTEROL SULFATE 90 UG/1
2 INHALANT RESPIRATORY (INHALATION) EVERY 6 HOURS
Qty: 8.5 G | Refills: 11 | Status: SHIPPED | OUTPATIENT
Start: 2025-02-06

## 2025-02-06 RX ORDER — BUDESONIDE AND FORMOTEROL FUMARATE DIHYDRATE 160; 4.5 UG/1; UG/1
2 AEROSOL RESPIRATORY (INHALATION) 2 TIMES DAILY
Qty: 1 G | Refills: 11 | Status: SHIPPED | OUTPATIENT
Start: 2025-02-06

## 2025-02-06 ASSESSMENT — PAIN SCALES - GENERAL: PAINLEVEL_OUTOF10: NO PAIN (0)

## 2025-02-06 NOTE — PROGRESS NOTES
[facility]     Pulmonary Clinic Note       PCP: Antoni Palmer    Reason for visit: Follow up for asthma, cough and wheezing.    Pulmonary HPI:   Cristo Connolly is a 50 year old  female w/ h/o severe obesity, allergies, and internal hemorrhoids and, who presents for a follow-up visit.  The patient was referred to the pulmonary clinic and was seen for the first time in the clinic on 3/18/2024.  At that time she was referred due to ongoing cough, and wheezing that was not responsive to treatment with antibiotics.  She has a history significant for allergies in the past.  In the her clinical history was concerning for asthma and reactive airway disease due to the allergies, wheezing, chronic cough and mucous plug.  She did have moderate obstruction on PFTs but no significant response to bronchodilator.    The patient was last seen in the pulmonary clinic on 6/7/2025, the patient continue to use the Symbicort, she is on the higher dose, she uses that twice daily, her asthma is well-controlled, due to lapse in her refills in September she had to be off the inhaler for 2 to 3 days, and then she started having worsening symptoms and her coworker noticed that she is short of breath, all improved after resuming the Symbicort again.  She rarely uses her rescue inhaler, he has not needed any systemic steroids since I saw her last time in the clinic.  No ED or urgent care evaluation or visits.      Patient is a never smoker.  No excessive EtOH use.  She denied vaping or recreational drug use.   Patient works as an , she immigrated from [place] 10 years ago, she received her decreased from [place] and [place].     Patient's outside records were reviewed via Care Everywhere &/or available paper records (sent for scanning).     Review of systems: a complete 12-point ROS conducted, & found to be negative w/ exceptions as noted in the HPI.    Past medical history:  History reviewed. No  pertinent past medical history.    Past Surgical History:   Procedure Laterality Date    EXAM UNDER ANESTHESIA RECTUM N/A 4/5/2016    Procedure: EXAM UNDER ANESTHESIA RECTUM;  Surgeon: Aric Eason MD;  Location: [facility] OR    HEMORRHOIDECTOMY INTERNAL  5/16/2014    Procedure: HEMORRHOIDECTOMY INTERNAL;  Surgeon: Myriam Joshua MD;  Location: [facility] OR       Social history:  Social History     Tobacco Use    Smoking status: Never    Smokeless tobacco: Never   Vaping Use    Vaping status: Never Used   Substance Use Topics    Alcohol use: No    Drug use: No       Family history:  Family history for asthma and her sisters.    Medications:  Current Outpatient Medications   Medication Sig Dispense Refill    albuterol (PROVENTIL HFA) 108 (90 Base) MCG/ACT inhaler Inhale 2 puffs into the lungs every 6 hours 8.5 g 11    albuterol (PROVENTIL) (2.5 MG/3ML) 0.083% neb solution Take 1 vial (2.5 mg) by nebulization every 6 hours as needed for shortness of breath, wheezing or cough 90 mL 5    budesonide-formoterol (SYMBICORT) 160-4.5 MCG/ACT Inhaler Inhale 2 puffs into the lungs 2 times daily. 1 g 4    budesonide-formoterol (SYMBICORT) 80-4.5 MCG/ACT Inhaler Inhale 2 puffs into the lungs two times daily for 30 days 1 g 4    fluticasone (FLONASE) 50 MCG/ACT nasal spray Spray 1 spray into both nostrils daily 1 g 0    IBUPROFEN PO Take 400 mg by mouth every 6 hours as needed for moderate pain      levocetirizine (XYZAL) 5 MG tablet Take 1 tablet (5 mg) by mouth every evening (Patient not taking: Reported on 6/7/2024) 90 tablet 1    OVER-THE-COUNTER Place Into the left eye 4 times daily (Patient not taking: Reported on 2/7/2024)      SYMBICORT 160-4.5 MCG/ACT Inhaler INHALE 2 PUFFS INTO THE LUNGS TWICE DAILY 10.2 g 1     No current facility-administered medications for this visit.       Allergies:  Allergies   Allergen Reactions    Quinine Itching       Physical examination:  BP (!) 140/72   Pulse 72   Resp 16   Ht 1.778 m  "(5' 10\")   Wt (!) 179.6 kg (396 lb)   SpO2 97%   BMI 56.82 kg/m      General: NAD  Lungs: Clear to auscultation bilaterally.  No wheezing.  Abd: Round.    Ext: WWP, +2 pitting edema bilaterally.  No clubbing.  Skin: No rashes, cyanosis, or jaundice  Neuro: Intact mentation w/ normal speech. Normal strength & muscle tone w/ normal gait & stance  Psych: Euthymic, normal affect, good eye contact    Labs: Reviewed labs personally.    CBC RESULTS:   Recent Labs   Lab Test 03/18/24  1444   WBC 6.9   RBC 5.00   HGB 13.0   HCT 41.2   MCV 82   MCH 26.0*   MCHC 31.6   RDW 14.0      Absolute eosinophils 500    Recent Labs   Lab Test 02/29/24  0853      POTASSIUM 4.0   CHLORIDE 107   CO2 24   ANIONGAP 9   *   BUN 10.4   CR 0.76   DANYELLE 9.0     N-terminal proBNP 63    Negative ABPA panel    Negative ANCA    Negative [place] allergy panel    Imaging: reviewed in [facility] & personally interpreted. Below are the interpretations from the formal Radiology review.  CXR 2/7/2024: No cardiomegaly.  No significant diffuse parenchymal changes or airspace disease.  I noticed some bronchial cuffing with bronchial wall thickening.    PFT:  2/6/2025, normal spirometry, normal lung volumes except for decreased FRC which could be seen in obesity, normal DLCO.  Compared to PFT from 3/14/2024 there is significant proved in FEV1.      Impression & recommendations:    Cristo was seen today for asthma and follow up.    Diagnoses and all orders for this visit:    Severe persistent asthma without complication (H)  -     Adult Pulmonology Clinic Follow-Up Order (6 Month)          50 year old female patient with past medical history significant for allergies, severe obesity, and internal hemorrhoids.  The patient was initially self-referred to the pulmonary clinic due to ongoing cough with wheezing and shortness of breath and was seen for the first time in clinic on 3/18/2024.  The patient clinical history is concerning for reactive " airway disease and asthma especially with her history of allergies, wheezing, and chronic cough with mucus plugging.      Patient made significant prove meant with inhaled corticosteroids and LABA, she had to be off for few days due to a lapse in refills, and she started having rebound symptoms, evidently she is still needs the ICS/LAMA so I would not change that this visit and I will continue Symbicort twice daily in addition to as needed albuterol.  Last visit I prescribed her the low dose Symbicort but currently she is on the high dose that was prescribed last December. Her asthma is well-controlled on that, so I will continue that and next visit I will attempt to step down on her therapy by decreasing the Symbicort to low-dose.  Patient improved with inhaled corticosteroids.  I will continue that but I will start to the lower dose ICS/LABA with Symbicort.  Continue as needed CRISTOFER.    Her PFT improved with the Symbicort and now she does not have obstruction.      Persistent severe asthma with acute exacerbation            6/7/2024     7:30 AM 6/7/2024     7:32 AM   ACT Total Scores   ACT TOTAL SCORE (Goal Greater than or Equal to 20) 23    In the past 12 months, how many times did you visit the emergency room for your asthma without being admitted to the hospital? 1 1   In the past 12 months, how many times were you hospitalized overnight because of your asthma?  0         -Continue high dose Symbicort   -Continue as needed CRISTOFER  -Follow-up in 6 months to decide if you would step down on her therapy to low dose Symbicort    High pretest probability for obstructive sleep apnea    -Low her fatigue improved after starting the Symbicort, she is still having fatigue with needs to take multiple naps, I will refer her to sleep clinic for sleep study.        Follow-up in 6 months    Chart documentation was completed, in part, with [employer] voice-recognition software. Even though reviewed, some grammatical, spelling, and  word errors may remain.    I spent 40 minutes reviewing chart, reviewing test results, talking with and examining patient, formulating plan, and documentation on the day of the encounter.          Emily Sahu MD   Pulmonary and Critical Care

## 2025-02-06 NOTE — PATIENT INSTRUCTIONS
Continue your inhaler with the Symbicort twice daily   Continue rinsing mouth and throat with water after each Symbicort use and use a spacer with the inhaler   Continue albuterol as needed   I referred you to sleep clinic   Follow up in 6 months

## 2025-02-19 ENCOUNTER — ORDERS ONLY (AUTO-RELEASED) (OUTPATIENT)
Dept: URGENT CARE | Facility: CLINIC | Age: 50
End: 2025-02-19
Payer: COMMERCIAL

## 2025-02-19 DIAGNOSIS — Z12.11 COLON CANCER SCREENING: ICD-10-CM

## 2025-02-25 ENCOUNTER — MYC MEDICAL ADVICE (OUTPATIENT)
Dept: PULMONOLOGY | Facility: CLINIC | Age: 50
End: 2025-02-25
Payer: COMMERCIAL

## 2025-02-25 DIAGNOSIS — J45.50 SEVERE PERSISTENT ASTHMA WITHOUT COMPLICATION (H): Primary | ICD-10-CM

## 2025-02-25 RX ORDER — INHALER, ASSIST DEVICES
SPACER (EA) MISCELLANEOUS
Qty: 1 EACH | Refills: 0 | Status: SHIPPED | OUTPATIENT
Start: 2025-02-25

## 2025-02-25 RX ORDER — ALBUTEROL SULFATE 0.83 MG/ML
2.5 SOLUTION RESPIRATORY (INHALATION) EVERY 6 HOURS PRN
Qty: 90 ML | Refills: 5 | Status: SHIPPED | OUTPATIENT
Start: 2025-02-25

## 2025-02-25 NOTE — CONFIDENTIAL NOTE
Prescription of albuterol was sent to the pharmacy of her choice   Emily Sahu MD   Pulmonary and Critical Care

## 2025-03-06 ENCOUNTER — TELEPHONE (OUTPATIENT)
Dept: FAMILY MEDICINE | Facility: CLINIC | Age: 50
End: 2025-03-06
Payer: COMMERCIAL

## 2025-03-06 NOTE — LETTER
March 6, 2025    To  Cristo Toddkenna Connolly  [address]   [place] [address]    Your team at [facility] cares about your health. We have reviewed your chart and based on our findings; we are making the following recommendations to better manage your health.     You are in particular need of attention regarding the following:     Schedule a primary care office visit with your provider for a Pap Smear to screen for Cervical Cancer.  PREVENTATIVE VISIT: Physical    If you have already completed these items, please contact the clinic via phone or   [employer] so your care team can review and update your records. Thank you for   choosing [facility] for your healthcare needs. For any questions,   concerns, or to schedule an appointment please contact our clinic.    Healthy Regards,    Your [facility] Care Team            Electronically signed

## 2025-03-06 NOTE — TELEPHONE ENCOUNTER
Patient Quality Outreach    Patient is due for the following:   Cervical Cancer Screening - PAP Needed  Physical Preventive Adult Physical    Action(s) Taken:   Schedule a Adult Preventative    Type of outreach:    Sent letter.    Questions for provider review:    None           Brianna Hebert, [facility]  Chart routed to none, letter sent.

## 2025-04-05 ENCOUNTER — HEALTH MAINTENANCE LETTER (OUTPATIENT)
Age: 50
End: 2025-04-05

## 2025-07-01 ENCOUNTER — TELEPHONE (OUTPATIENT)
Dept: FAMILY MEDICINE | Facility: CLINIC | Age: 50
End: 2025-07-01
Payer: COMMERCIAL

## 2025-07-01 NOTE — LETTER
July 8, 2025      Cristo Connolly  [address]   [place] [address]        Dear Cristo,     Your care team at [facility] values your health and well-being. Our records indicate that you are due to complete one or more questionnaires to follow-up on your health.    Annual preventative exam.     Asthma Control Test/Childhood Control Test (ACT/CACT):   This screening tool helps us assess how well your asthma is being managed.?Maintaining good asthma control can reduce symptoms and improve your overall health. If your score is below 20, we recommend scheduling an appointment with your provider to discuss potential medication or lifestyle adjustments.     We have enclosed the necessary questionnaire(s) along with a pre-addressed, pre-postage envelope for your convenience. Please take a few moments to complete and return it to us at your earliest convenience. Your responses provide valuable information to your care team and play an essential role in supporting your health.    Thank you for taking the time to complete the questionnaire(s) and continuing to trust us with your care.    Sincerely,    Your [facility] Care Team             
No

## 2025-07-01 NOTE — TELEPHONE ENCOUNTER
Patient Quality Outreach    Patient is due for the following:   Asthma  -  ACT needed  Physical Preventive Adult Physical    Action(s) Taken:   Schedule a Adult Preventative  Patient was assigned appropriate questionnaire to complete    Type of outreach:    Sent [employer] message.  Will postpone x 1 week, if not viewed or completed please mail ACT.       Questions for provider review:    None         Connie Fairbanks [facility]  Chart routed to Care Team.

## 2025-07-19 ASSESSMENT — ASTHMA QUESTIONNAIRES

## 2025-07-23 ENCOUNTER — TELEPHONE (OUTPATIENT)
Dept: FAMILY MEDICINE | Facility: CLINIC | Age: 50
End: 2025-07-23
Payer: COMMERCIAL

## 2025-07-23 ASSESSMENT — ASTHMA QUESTIONNAIRES: ACT_TOTALSCORE: 25

## 2025-07-23 NOTE — TELEPHONE ENCOUNTER
Patient Quality Outreach    Patient is due for the following:   Asthma  -  ACT needed    Action(s) Taken:   Patient mailed back ACT Score 25, updated in chart.    Type of outreach:    Copy of ACT mailed to patient.    Questions for provider review:    None         Nick Blanca, [facility]  Chart routed to None.

## 2025-08-25 ENCOUNTER — E-VISIT (OUTPATIENT)
Dept: URGENT CARE | Facility: CLINIC | Age: 50
End: 2025-08-25
Payer: COMMERCIAL

## 2025-08-25 DIAGNOSIS — N39.0 ACUTE UTI (URINARY TRACT INFECTION): Primary | ICD-10-CM

## 2025-08-25 RX ORDER — NITROFURANTOIN 25; 75 MG/1; MG/1
100 CAPSULE ORAL 2 TIMES DAILY
Qty: 10 CAPSULE | Refills: 0 | Status: SHIPPED | OUTPATIENT
Start: 2025-08-25 | End: 2025-08-30